# Patient Record
Sex: FEMALE | Race: OTHER | NOT HISPANIC OR LATINO
[De-identification: names, ages, dates, MRNs, and addresses within clinical notes are randomized per-mention and may not be internally consistent; named-entity substitution may affect disease eponyms.]

---

## 2021-01-04 PROBLEM — Z00.00 ENCOUNTER FOR PREVENTIVE HEALTH EXAMINATION: Status: ACTIVE | Noted: 2021-01-04

## 2021-01-11 ENCOUNTER — APPOINTMENT (OUTPATIENT)
Dept: OBGYN | Facility: CLINIC | Age: 36
End: 2021-01-11
Payer: MEDICAID

## 2021-01-11 VITALS — SYSTOLIC BLOOD PRESSURE: 100 MMHG | DIASTOLIC BLOOD PRESSURE: 60 MMHG | WEIGHT: 121 LBS

## 2021-01-11 DIAGNOSIS — Z78.9 OTHER SPECIFIED HEALTH STATUS: ICD-10-CM

## 2021-01-11 DIAGNOSIS — N39.0 URINARY TRACT INFECTION, SITE NOT SPECIFIED: ICD-10-CM

## 2021-01-11 DIAGNOSIS — Z82.49 FAMILY HISTORY OF ISCHEMIC HEART DISEASE AND OTHER DISEASES OF THE CIRCULATORY SYSTEM: ICD-10-CM

## 2021-01-11 PROCEDURE — 36415 COLL VENOUS BLD VENIPUNCTURE: CPT

## 2021-01-11 PROCEDURE — 0500F INITIAL PRENATAL CARE VISIT: CPT

## 2021-01-11 RX ORDER — PNV NO.95/FERROUS FUM/FOLIC AC 28MG-0.8MG
TABLET ORAL
Refills: 0 | Status: ACTIVE | COMMUNITY

## 2021-01-11 NOTE — HISTORY OF PRESENT ILLNESS
[N] : Patient does not use contraception [Monogamous (Male Partner)] : is monogamous with a male partner [Y] : Positive pregnancy history [FreeTextEntry1] : Confirmation of pregnancy.\par LMP:10/6/2020\par RICCO:7/14/2021\par 13w6D [MensesFreq] : 28 [MensesLength] : 3-4 [PGxTotal] : 1 [facility] : 0

## 2021-01-12 LAB
ABO + RH PNL BLD: NORMAL
BASOPHILS # BLD AUTO: 0.06 K/UL
BASOPHILS NFR BLD AUTO: 0.5 %
BLD GP AB SCN SERPL QL: NORMAL
EOSINOPHIL # BLD AUTO: 0.14 K/UL
EOSINOPHIL NFR BLD AUTO: 1.3 %
HBV SURFACE AG SER QL: NONREACTIVE
HCT VFR BLD CALC: 42.1 %
HCV AB SER QL: NONREACTIVE
HCV S/CO RATIO: 0.11 S/CO
HGB BLD-MCNC: 13.7 G/DL
HIV1+2 AB SPEC QL IA.RAPID: NONREACTIVE
IMM GRANULOCYTES NFR BLD AUTO: 0.5 %
LYMPHOCYTES # BLD AUTO: 1.64 K/UL
LYMPHOCYTES NFR BLD AUTO: 14.8 %
MAN DIFF?: NORMAL
MCHC RBC-ENTMCNC: 32.5 GM/DL
MCHC RBC-ENTMCNC: 32.6 PG
MCV RBC AUTO: 100.2 FL
MONOCYTES # BLD AUTO: 0.72 K/UL
MONOCYTES NFR BLD AUTO: 6.5 %
NEUTROPHILS # BLD AUTO: 8.45 K/UL
NEUTROPHILS NFR BLD AUTO: 76.4 %
PLATELET # BLD AUTO: 258 K/UL
RBC # BLD: 4.2 M/UL
RBC # FLD: 13.7 %
TSH SERPL-ACNC: 0.89 UIU/ML
WBC # FLD AUTO: 11.06 K/UL

## 2021-01-13 LAB
C TRACH RRNA SPEC QL NAA+PROBE: NOT DETECTED
CANDIDA VAG CYTO: DETECTED
CMV IGG SERPL QL: 5.2 U/ML
CMV IGG SERPL-IMP: POSITIVE
CMV IGM SERPL QL: <8 AU/ML
CMV IGM SERPL QL: NEGATIVE
G VAGINALIS+PREV SP MTYP VAG QL MICRO: NOT DETECTED
HGB A MFR BLD: 97.4 %
HGB A2 MFR BLD: 2.6 %
HGB FRACT BLD-IMP: NORMAL
HSV 1+2 IGG SER IA-IMP: POSITIVE
HSV 1+2 IGG SER IA-IMP: POSITIVE
HSV1 IGG SER QL: 43.1 INDEX
HSV2 IGG SER QL: 4.06 INDEX
MEV IGG FLD QL IA: 141 AU/ML
MEV IGG+IGM SER-IMP: POSITIVE
N GONORRHOEA RRNA SPEC QL NAA+PROBE: NOT DETECTED
RUBV IGG FLD-ACNC: 29.3 INDEX
RUBV IGG SER-IMP: POSITIVE
RUBV IGM FLD-ACNC: <20 AU/ML
SOURCE AMPLIFICATION: NORMAL
T GONDII AB SER-IMP: NEGATIVE
T GONDII AB SER-IMP: POSITIVE
T GONDII IGG SER QL: 70.2 IU/ML
T GONDII IGM SER QL: <3 AU/ML
T PALLIDUM AB SER QL IA: NEGATIVE
T VAGINALIS VAG QL WET PREP: NOT DETECTED
VZV AB TITR SER: POSITIVE
VZV IGG SER IF-ACNC: 2681 INDEX

## 2021-01-14 LAB
B19V IGG SER QL IA: 2.9 INDEX
B19V IGG+IGM SER-IMP: NORMAL
B19V IGG+IGM SER-IMP: POSITIVE
B19V IGM FLD-ACNC: 0.22 INDEX
B19V IGM SER-ACNC: NEGATIVE
BACTERIA UR CULT: NORMAL
CYTOLOGY CVX/VAG DOC THIN PREP: ABNORMAL
HSV1 IGM SER QL: NORMAL TITER
HSV2 AB FLD-ACNC: NORMAL TITER
VZV IGM SER IF-ACNC: <0.91 INDEX

## 2021-01-16 LAB
AR GENE MUT ANL BLD/T: NORMAL
MEV IGM SER QL: <0.91 ISR

## 2021-01-19 LAB
CFTR MUT TESTED BLD/T: NEGATIVE
FMR1 GENE MUT ANL BLD/T: NORMAL

## 2021-01-25 LAB
15Q 11.2 DELETION: NEGATIVE
1P36 DELETION SYNDROME: NEGATIVE
229 11.2 DELETION SYNDROME: NEGATIVE
4P DELETION: NEGATIVE
5P DELETION: NEGATIVE
CHROMOSOME 13 ANEUPLOIDY: NEGATIVE
CHROMOSOME 18 ANEUPLOIDY: NEGATIVE
CHROMOSOME 21 ANEUPLOIDY: NEGATIVE

## 2021-01-28 ENCOUNTER — EMERGENCY (EMERGENCY)
Facility: HOSPITAL | Age: 36
LOS: 1 days | Discharge: HOME | End: 2021-01-28
Attending: EMERGENCY MEDICINE | Admitting: EMERGENCY MEDICINE
Payer: COMMERCIAL

## 2021-01-28 VITALS
WEIGHT: 113.1 LBS | HEIGHT: 60 IN | TEMPERATURE: 98 F | DIASTOLIC BLOOD PRESSURE: 77 MMHG | RESPIRATION RATE: 18 BRPM | SYSTOLIC BLOOD PRESSURE: 120 MMHG | HEART RATE: 81 BPM | OXYGEN SATURATION: 98 %

## 2021-01-28 DIAGNOSIS — B37.9 CANDIDIASIS, UNSPECIFIED: ICD-10-CM

## 2021-01-28 DIAGNOSIS — O23.592 INFECTION OF OTHER PART OF GENITAL TRACT IN PREGNANCY, SECOND TRIMESTER: ICD-10-CM

## 2021-01-28 DIAGNOSIS — Z3A.15 15 WEEKS GESTATION OF PREGNANCY: ICD-10-CM

## 2021-01-28 LAB
APPEARANCE UR: CLEAR — SIGNIFICANT CHANGE UP
BILIRUB UR-MCNC: NEGATIVE — SIGNIFICANT CHANGE UP
COLOR SPEC: YELLOW — SIGNIFICANT CHANGE UP
DIFF PNL FLD: NEGATIVE — SIGNIFICANT CHANGE UP
GLUCOSE UR QL: NEGATIVE — SIGNIFICANT CHANGE UP
KETONES UR-MCNC: NEGATIVE — SIGNIFICANT CHANGE UP
LEUKOCYTE ESTERASE UR-ACNC: NEGATIVE — SIGNIFICANT CHANGE UP
NITRITE UR-MCNC: NEGATIVE — SIGNIFICANT CHANGE UP
PH UR: 6 — SIGNIFICANT CHANGE UP (ref 5–8)
PROT UR-MCNC: NEGATIVE MG/DL — SIGNIFICANT CHANGE UP
SP GR SPEC: 1.01 — SIGNIFICANT CHANGE UP (ref 1–1.03)
UROBILINOGEN FLD QL: 0.2 E.U./DL — SIGNIFICANT CHANGE UP

## 2021-01-28 PROCEDURE — 87591 N.GONORRHOEAE DNA AMP PROB: CPT

## 2021-01-28 PROCEDURE — 87491 CHLMYD TRACH DNA AMP PROBE: CPT

## 2021-01-28 PROCEDURE — 87800 DETECT AGNT MULT DNA DIREC: CPT

## 2021-01-28 PROCEDURE — 99283 EMERGENCY DEPT VISIT LOW MDM: CPT

## 2021-01-28 PROCEDURE — 87086 URINE CULTURE/COLONY COUNT: CPT

## 2021-01-28 PROCEDURE — 36415 COLL VENOUS BLD VENIPUNCTURE: CPT

## 2021-01-28 PROCEDURE — 99284 EMERGENCY DEPT VISIT MOD MDM: CPT

## 2021-01-28 PROCEDURE — 81003 URINALYSIS AUTO W/O SCOPE: CPT

## 2021-01-28 NOTE — ED ADULT NURSE NOTE - CHIEF COMPLAINT QUOTE
pt c/o vaginal discharge x 3 weeks, green in color x1 week. pt is 15 weeks pregnant, called md twice today with no response, came to ed for eval after researching online. pt is Slovak speaking,  interpreting

## 2021-01-28 NOTE — ED PROVIDER NOTE - OBJECTIVE STATEMENT
Pt w/ PMHx  (ectopic), PSHx spinal herniated disk and inguinal hernia p/w vag discharge x 2 weeks. The discharge is white, but now becoming yellow, w/ vaginal itching. No pelvic pain, f/c, n/v. No hx STDs. No concern for STDs. No recent abx use. PT saw OB Dr Richter 2 weeks ago, had testing, but unsure of the results. Pt attempted to contact him today w/o response, prompting ED visit. No vag bleeding. PT reports prior US showing IUP.

## 2021-01-28 NOTE — ED PROVIDER NOTE - PATIENT PORTAL LINK FT
You can access the FollowMyHealth Patient Portal offered by [facility] by registering at the following website: [email or web address]. By joining [employer]’s FollowMyHealth portal, you will also be able to view your health information using other applications (apps) compatible with our system.

## 2021-01-28 NOTE — ED PROVIDER NOTE - PROGRESS NOTE DETAILS
Outpt labs from 1/11/21 shows BV panel + for candida, neg for trich/gardenella. HIV neg. GC/chlam neg.

## 2021-01-28 NOTE — ED PROVIDER NOTE - NSFOLLOWUPINSTRUCTIONS_ED_ALL_ED_FT
You were evaluated in the ED for vaginal discharge. Your recent vaginal swabs performed in your OB's office on 1/11/21 were reviewed and were significant for candidal yeast infection. The treatment is intravaginal cream, prescribed today. You vaginal swabs were again repeated (bacterial vaginosis panel, gonorrhea/ chlamydia tests) and the results will return in 2-3 days. You obstetrician will be able to see these results as an outpatient. Additionally, a urine culture was sent today. A bedside ultrasound was performed and showed good fetal movement and a normal fetal heart beat of 141 beats.     Refrain from having sexual intercourse until you follow up with your obstetrician and otherwise advised. Return to the ED for abdominal pain, bleeding, clear vaginal discharge, fever, or other concerning symptoms.     Le evaluaron en el servicio de urgencias para detectar flujo vaginal. Se revisaron jayla recientes hisopos vaginales realizados en el consultorio de sandoval obstetra el 1/11/21 y fueron significativos para la candidiasis candidiasis. El tratamiento es dieter crema intravaginal, prescrita maximiliano. Se repitieron nuevamente jayla hisopos vaginales (panel de vaginosis bacteriana, pruebas de gonorrea / clamdidia) y los resultados volverán en 2-3 días. Sandoval obstetra podrá geraldo estos resultados de forma ambulatoria. Además, hoy se envió un cultivo de orina. Se realizó dieter ecografía de cabecera que mostró un buen movimiento fetal y un latido fetal normal de 141 latidos.    Abstente de tener relaciones sexuales hasta que hagas un seguimiento con tu obstetra y te indique lo contrario. Regrese al servicio de urgencias si tiene dolor abdominal, sangrado, flujo vaginal kaylie, fiebre u otros síntomas preocupantes.      Candidiasis (infección por hongos)    LO QUE NECESITA SABER:    La infección por hongos, o candidiasis vaginal, dieter infección vaginal común. La infección por hongos es causada por un hongo o microorganismo levaduriforme. Los hongos se encuentran normalmente en la vagina. Muchos hongos pueden causar dieter infección.    INSTRUCCIONES SOBRE EL AARON HOSPITALARIA:    Llame a sandoval médico o ginecólogo si:  •Usted tiene fiebre y escalofríos.      •Usted desarrolla un dolor abdominal o pélvico.      •La secreción contiene clark y no es de la menstruación.      •Jayla signos y síntomas empeoran, incluso después del tratamiento.      •Usted tiene preguntas o inquietudes acerca de sandoval condición o cuidado.      Medicamentos:  •Los medicamentosayudan a tratar la infección por el hongo de la cándida y a disminuir la inflamación. El medicamento puede venir en presentación de píldora, crema, ungüento, comprimido o supositorio para la vagina.      •[place] jayla medicamentos elinor se le haya indicado.Consulte con sandoval médico si usted sha que sandoval medicamento no le está ayudando o si presenta efectos secundarios. Infórmele si es alérgico a algún medicamento. Mantenga dieter lista actualizada de los medicamentos, las vitaminas y los productos herbales que jesu. Incluya los siguientes datos de los medicamentos: cantidad, frecuencia y motivo de administración. Traiga con usted la lista o los envases de las píldoras a jayla citas de seguimiento. Lleve la lista de los medicamentos con usted en betsy de dieter emergencia.      Mantenga sandoval vagina saludable:  •Limpie alrededor del área genital con agua tibia y un jabón suave todos los días.No coloque jabón dentro de la vagina. Después de lavada, séquela suavemente. No use jacuzzis. El calor y la humedad de los jacuzzis pueden aumentar el riesgo de otra candidiasis.      •Límpiese siempre de adelante hacia atrásdespués de usar el inodoro. [place] rogelio la diseminación de bacterias desde el área rectal hacia la vagina.      •No use ropa ni lencería apretadadurante largos períodos. Que use ropa interior de algodón leydi el día. El algodón ayuda a mantener el área genital seca y no mantiene el calor o la humedad. No use ninguna ropa interior por las noches.      •No tome duchas vaginalesni use aerosoles de higiene femenina o gasper de burbujas. No utilice almohadillas o tampones que son perfumados o de colores ni papel higiénico perfumado.      •No tenga relaciones sexuales hasta que jayla síntomas hayan desaparecido.Michelle que sandoval lois use un preservativo hasta que usted complete el tratamiento.      •Consulte con sandoval médico acerca de las opciones de anticonceptivos, si es necesario.Los condones de látex y los diafragmas tienen un gel que rivera los espermatozoides. Ambos pueden irritar el área genital.      Acuda a jayla consultas de control con sandoval médico o ginecólogo según le indicaron:Anote jayla preguntas para que se acuerde de hacerlas leydi jayla visitas.

## 2021-01-28 NOTE — ED PROVIDER NOTE - NS ED ROS FT
Constitutional: No fever or chills.   Eyes: No pain, blurry vision, or discharge.  ENMT: No hearing changes, pain, discharge or infections. No neck pain or stiffness.  Cardiac: No chest pain, SOB or edema. No chest pain with exertion.  Respiratory: No cough or respiratory distress. No hemoptysis. No history of asthma or RAD.  GI: No nausea, vomiting, diarrhea or abdominal pain.  : No dysuria, frequency or burning. See HPI  MS: No myalgia, muscle weakness, joint pain or back pain.  Neuro: No headache or weakness. No LOC.  Skin: No skin rash.   Endocrine: No history of thyroid disease or diabetes.  Except as documented in the HPI, all other systems are negative.

## 2021-01-28 NOTE — ED ADULT TRIAGE NOTE - CHIEF COMPLAINT QUOTE
pt c/o vaginal discharge x 3 weeks, green in color x1 week. pt is 15 weeks pregnant, called md twice today with no response, came to ed for eval after researching online. pt is Welsh speaking,  interpreting

## 2021-01-28 NOTE — ED PROVIDER NOTE - CLINICAL SUMMARY MEDICAL DECISION MAKING FREE TEXT BOX
Pt p/w vag discharge. NO abd pain, f/c, n/v. + prior IUP. Bedside TA US shows IUP w/ good fetal movement and . + recent vaginal swabs only negative for candida. Pt not concerned about STDs. Given discharge more yellow at this time,  will repeat swabs, and initiate tx for candidal yeast infection. PT has next OB appt in 10 days, instructed continue f/u. Pt has no urinary sx, and UA likely to be contaminated, f/u UCx results. Return precautions given.

## 2021-01-28 NOTE — ED ADULT NURSE NOTE - OBJECTIVE STATEMENT
35 y/o female received into the ED, axox3, with complaints of vaginal discharge x3 weeks.  Pt. states that she is pregnant with approx.. 15 weeks pregnant.  Pt. denies having any abdominal pain.

## 2021-01-28 NOTE — ED PROVIDER NOTE - PHYSICAL EXAMINATION
Limited PE performed in the setting of the COVID10 pandemic, in efforts to limit exposure and cross-contamination  Constitutional: Well appearing, well nourished, awake, alert, oriented to person, place, time/situation and in no apparent distress.  ENMT: Airway patent.   Eyes: Clear bilaterally  Cardiac: Normal rate, regular rhythm.   Respiratory: No increased WOB, tachypnea, hypoxia, or accessory mm use. Pt speaks in full sentences.   Gastrointestinal: Abd soft, NT, ND. No guarding, rebound, or rigidity. gravid uterus. bedside US shows + fetal movement and  by M mode  : normal external genitalia. No CMT or adnexal ttp. + yellowish discharge, some thick, chunky discharge  Musculoskeletal: Range of motion is not limited  Neuro: Alert and oriented x 3, face symmetric and speech fluent. Nml gross motor movement, grossly non focal   Skin: Skin normal color for race, warm, dry and intact. No evidence of rash.  Psych: Alert and oriented to person, place, time/situation. normal mood and affect. no apparent risk to self or others.

## 2021-01-30 LAB
C TRACH RRNA SPEC QL NAA+PROBE: SIGNIFICANT CHANGE UP
CANDIDA AB TITR SER: DETECTED
CULTURE RESULTS: NO GROWTH — SIGNIFICANT CHANGE UP
G VAGINALIS DNA SPEC QL NAA+PROBE: DETECTED
N GONORRHOEA RRNA SPEC QL NAA+PROBE: SIGNIFICANT CHANGE UP
SPECIMEN SOURCE: SIGNIFICANT CHANGE UP
SPECIMEN SOURCE: SIGNIFICANT CHANGE UP
T VAGINALIS SPEC QL WET PREP: SIGNIFICANT CHANGE UP

## 2021-02-01 RX ORDER — METRONIDAZOLE 7.5 MG/G
1 GEL VAGINAL
Qty: 5 | Refills: 0
Start: 2021-02-01 | End: 2021-02-05

## 2021-02-02 RX ORDER — METRONIDAZOLE 7.5 MG/G
1 GEL VAGINAL
Qty: 7 | Refills: 0
Start: 2021-02-02 | End: 2021-02-08

## 2021-02-08 ENCOUNTER — APPOINTMENT (OUTPATIENT)
Dept: OBGYN | Facility: CLINIC | Age: 36
End: 2021-02-08
Payer: MEDICAID

## 2021-02-08 ENCOUNTER — NON-APPOINTMENT (OUTPATIENT)
Age: 36
End: 2021-02-08

## 2021-02-08 VITALS — SYSTOLIC BLOOD PRESSURE: 100 MMHG | WEIGHT: 115 LBS | DIASTOLIC BLOOD PRESSURE: 60 MMHG

## 2021-02-08 PROCEDURE — 0502F SUBSEQUENT PRENATAL CARE: CPT

## 2021-02-08 PROCEDURE — 36415 COLL VENOUS BLD VENIPUNCTURE: CPT

## 2021-02-09 ENCOUNTER — APPOINTMENT (OUTPATIENT)
Dept: ANTEPARTUM | Facility: CLINIC | Age: 36
End: 2021-02-09
Payer: MEDICAID

## 2021-02-09 PROCEDURE — 76817 TRANSVAGINAL US OBSTETRIC: CPT

## 2021-02-09 PROCEDURE — 76811 OB US DETAILED SNGL FETUS: CPT

## 2021-02-09 PROCEDURE — 99072 ADDL SUPL MATRL&STAF TM PHE: CPT

## 2021-02-12 LAB
2ND TRIMESTER DATA: NORMAL
AFP PNL SERPL: NORMAL
AFP SERPL-ACNC: NORMAL
B-HCG FREE SERPL-MCNC: NORMAL
CLINICAL BIOCHEMIST REVIEW: NORMAL
INHIBIN A SERPL-MCNC: NORMAL
NOTES NTD: NORMAL
U ESTRIOL SERPL-SCNC: NORMAL

## 2021-02-16 ENCOUNTER — ASOB RESULT (OUTPATIENT)
Age: 36
End: 2021-02-16

## 2021-02-16 ENCOUNTER — APPOINTMENT (OUTPATIENT)
Dept: ANTEPARTUM | Facility: CLINIC | Age: 36
End: 2021-02-16
Payer: MEDICAID

## 2021-02-16 PROCEDURE — 99072 ADDL SUPL MATRL&STAF TM PHE: CPT

## 2021-02-16 PROCEDURE — 59000 AMNIOCENTESIS DIAGNOSTIC: CPT

## 2021-02-16 PROCEDURE — 76946 ECHO GUIDE FOR AMNIOCENTESIS: CPT

## 2021-02-22 ENCOUNTER — EMERGENCY (EMERGENCY)
Facility: HOSPITAL | Age: 36
LOS: 1 days | Discharge: HOME | End: 2021-02-22
Attending: EMERGENCY MEDICINE | Admitting: EMERGENCY MEDICINE
Payer: COMMERCIAL

## 2021-02-22 VITALS
DIASTOLIC BLOOD PRESSURE: 77 MMHG | WEIGHT: 117.07 LBS | OXYGEN SATURATION: 98 % | HEIGHT: 60 IN | RESPIRATION RATE: 18 BRPM | HEART RATE: 88 BPM | SYSTOLIC BLOOD PRESSURE: 116 MMHG | TEMPERATURE: 99 F

## 2021-02-22 DIAGNOSIS — Z3A.19 19 WEEKS GESTATION OF PREGNANCY: ICD-10-CM

## 2021-02-22 DIAGNOSIS — M54.6 PAIN IN THORACIC SPINE: ICD-10-CM

## 2021-02-22 DIAGNOSIS — O26.892 OTHER SPECIFIED PREGNANCY RELATED CONDITIONS, SECOND TRIMESTER: ICD-10-CM

## 2021-02-22 DIAGNOSIS — Z79.899 OTHER LONG TERM (CURRENT) DRUG THERAPY: ICD-10-CM

## 2021-02-22 PROCEDURE — 99284 EMERGENCY DEPT VISIT MOD MDM: CPT

## 2021-02-22 PROCEDURE — 99283 EMERGENCY DEPT VISIT LOW MDM: CPT

## 2021-02-22 RX ORDER — LIDOCAINE 4 G/100G
1 CREAM TOPICAL
Qty: 30 | Refills: 0
Start: 2021-02-22 | End: 2021-03-23

## 2021-02-22 RX ORDER — ACETAMINOPHEN 500 MG
1000 TABLET ORAL ONCE
Refills: 0 | Status: COMPLETED | OUTPATIENT
Start: 2021-02-22 | End: 2021-02-22

## 2021-02-22 RX ORDER — ACETAMINOPHEN 500 MG
2 TABLET ORAL
Qty: 30 | Refills: 0
Start: 2021-02-22

## 2021-02-22 RX ORDER — LIDOCAINE 4 G/100G
1 CREAM TOPICAL ONCE
Refills: 0 | Status: COMPLETED | OUTPATIENT
Start: 2021-02-22 | End: 2021-02-22

## 2021-02-22 RX ADMIN — LIDOCAINE 1 PATCH: 4 CREAM TOPICAL at 22:27

## 2021-02-22 RX ADMIN — Medication 1000 MILLIGRAM(S): at 22:25

## 2021-02-22 NOTE — ED ADULT NURSE NOTE - OBJECTIVE STATEMENT
Pt reports R upper back pain ongoing x2 weeks, worse since Saturday. Pt reports pain to R upper back, no radiation reported, pain described as sharp/stabbing and intermittent, worse with movement. Pt denies N/V/D, no urinary symptoms, no abd pain. Pt is 19 weeks pregnant, no vaginal bleeding/discharge. Pt denies SOB/CP, no cough, no fever/chills, no recent travel, no known exposure to covid. Pt reports she took Tylenol yesterday with no pain relief. Significant other at bedside. Pt updated on POC.

## 2021-02-22 NOTE — ED ADULT NURSE NOTE - NS ED NOTE  TALK SOMEONE YN
slim is requesting a call back states that her  would like to speak to Dr. Pink for her settlement.    No

## 2021-02-22 NOTE — ED PROVIDER NOTE - OBJECTIVE STATEMENT
37 y/o F pt who is  and 19 weeks pregnant with no pertinent PMHx or PSHx presents to ED c/o of mid-thoracic back pain radiating to both sides x 2 weeks. Pt states her pain feels like an electric shock, and she's had similar symptoms before in her lower back due to a herniated disc, but it had improved in her lower back; pt's current pain is only in the thoracic region. Pt relates taking Tylenol 325mg with no improvement. Pt denies any pregnancy complaints, cramping, vaginal bleeding, chest pain, shortness of breath, pleuritic nature to pain, weakness to arms, or any other acute complaints.

## 2021-02-22 NOTE — ED PROVIDER NOTE - NSFOLLOWUPINSTRUCTIONS_ED_ALL_ED_FT
Use lidocaine patch and tylenol as prescribed.    Follow up with your OB tomorrow.    Return to ED with worsening pain or other concerns.          Back Pain    WHAT YOU NEED TO KNOW:    What do I need to know about back pain? Back pain is common. You may have back pain and muscle spasms. You may feel sore or stiff on one or both sides of your back. The pain may spread to your lower body. Conditions that affect the spine, joints, or muscles can cause back pain. These may include arthritis, spinal stenosis (narrowing of the spinal column), muscle tension, or breakdown of the spinal discs.    What increases my risk for back pain?   •Repeated bending, lifting, or twisting, or lifting heavy items      •Injury from a fall or accident      •Lack of regular physical activity       •Obesity or pregnancy       •Smoking      •Aging      •Driving, sitting, or standing for long periods      •Bad posture while sitting or standing      How is back pain diagnosed? Your healthcare provider will ask if you have any medical conditions. He or she may ask if you have a history of back pain and how it started. He or she may watch you stand and walk, and check your range of motion. Show him or her where you feel pain and what makes it better or worse. Describe the pain, how bad it is, and how long it lasts. Tell your provider if your pain worsens at night or when you lie on your back.    How is back pain treated?   •Medicines: ?NSAIDs, such as ibuprofen, help decrease swelling, pain, and fever. This medicine is available with or without a doctor's order. NSAIDs may be given as a pill or as a cream that is applied to your back. NSAIDs can cause stomach bleeding or kidney problems in certain people. If you take blood thinner medicine, always ask your healthcare provider if NSAIDs are safe for you. Always read the medicine label and follow directions.      ?Acetaminophen decreases pain and fever. It is available without a doctor's order. Ask how much to take and how often to take it. Follow directions. Read the labels of all other medicines you are using to see if they also contain acetaminophen, or ask your doctor or pharmacist. Acetaminophen can cause liver damage if not taken correctly. Do not use more than 4 grams (4,000 milligrams) total of acetaminophen in one day.       ?Muscle relaxers help decrease muscle spasms and back pain.      •Acupressure may be recommended to decrease pain and improve movement. Acupressure is pressure or localized massage to the area of your back pain.       •A transcutaneous electrical nerve stimulation (TENS) unit is a portable, pocket-sized, battery-powered device that attaches to your skin. It is usually placed over the area of pain. It uses mild, safe electrical signals to help control pain.      How do I manage my back pain?   •Apply ice on your back for 15 to 20 minutes every hour or as directed. Use an ice pack, or put crushed ice in a plastic bag. Cover it with a towel before you apply it to your skin. Ice helps prevent tissue damage and decreases pain.      •Apply heat on your back for 20 to 30 minutes every 2 hours for as many days as directed. Heat helps decrease pain and muscle spasms.      •Stay active as much as you can without causing more pain. Bed rest could make your back pain worse. Avoid heavy lifting until your pain is gone.      •Go to physical therapy as directed. A physical therapist can teach you exercises to help improve movement and strength, and to decrease pain.      Call your local emergency number (911 in the ) if:   •You have severe back pain with chest pain.      •You cannot control your urine or bowel movements.      •Your pain becomes so severe that you cannot walk.      When should I seek immediate care?   •You have pain, numbness, or weakness in one or both legs.      •You have severe back pain, nausea, and vomiting.      •You have severe back pain that spreads to your side or genital area.      When should I call my doctor?   •You have back pain that does not get better with rest and pain medicine.      •You have a fever.      •You have pain that worsens when you are on your back or when you rest.      •You have pain that worsens when you cough or sneeze.      •You lose weight without trying.      •You have questions or concerns about your condition or care.      CARE AGREEMENT:    You have the right to help plan your care. Learn about your health condition and how it may be treated. Discuss treatment options with your healthcare providers to decide what care you want to receive. You always have the right to refuse treatment.        © Copyright [employer] 2021           back to top                          © Copyright [employer] 2021

## 2021-02-22 NOTE — ED ADULT TRIAGE NOTE - OTHER COMPLAINTS
19 weeks  presenting to ED c/o upper back pain x 2 weeks, located between shoulders radiating to R arm, sharp shooting pain to arm with sudden movements, no numbness/tingling, relieved by not moving.  Took tylenol last night with no relief.

## 2021-02-22 NOTE — ED PROVIDER NOTE - PATIENT PORTAL LINK FT
You can access the FollowMyHealth Patient Portal offered by [facility] by registering at the following website: [email or web address]. By joining [employer]’s FollowMyHealth portal, you will also be able to view your health information using other applications (apps) compatible with our system.

## 2021-02-23 ENCOUNTER — EMERGENCY (EMERGENCY)
Facility: HOSPITAL | Age: 36
LOS: 1 days | Discharge: HOME | End: 2021-02-23
Attending: EMERGENCY MEDICINE | Admitting: EMERGENCY MEDICINE
Payer: COMMERCIAL

## 2021-02-23 VITALS
RESPIRATION RATE: 18 BRPM | HEART RATE: 91 BPM | WEIGHT: 117.07 LBS | DIASTOLIC BLOOD PRESSURE: 75 MMHG | TEMPERATURE: 100 F | OXYGEN SATURATION: 100 % | SYSTOLIC BLOOD PRESSURE: 114 MMHG | HEIGHT: 60 IN

## 2021-02-23 DIAGNOSIS — Z3A.20 20 WEEKS GESTATION OF PREGNANCY: ICD-10-CM

## 2021-02-23 DIAGNOSIS — O99.891 OTHER SPECIFIED DISEASES AND CONDITIONS COMPLICATING PREGNANCY: ICD-10-CM

## 2021-02-23 DIAGNOSIS — Z79.899 OTHER LONG TERM (CURRENT) DRUG THERAPY: ICD-10-CM

## 2021-02-23 DIAGNOSIS — R51.9 HEADACHE, UNSPECIFIED: ICD-10-CM

## 2021-02-23 PROCEDURE — 99283 EMERGENCY DEPT VISIT LOW MDM: CPT

## 2021-02-23 PROCEDURE — 99284 EMERGENCY DEPT VISIT MOD MDM: CPT

## 2021-02-23 NOTE — ED PROVIDER NOTE - CLINICAL SUMMARY MEDICAL DECISION MAKING FREE TEXT BOX
most likely headache is 2/2 the muscular pain she is having in her upper back and radiating up. doubt dangerous intracranial process at this time, and not hypertensive in the ED. plan for pain control and GYN f/u

## 2021-02-23 NOTE — ED ADULT NURSE NOTE - CAS DISCH TRANSFER METHOD
1850  Patient's in bed. No changes in status. Bedside report given to Tere LOBO RN (Ana María).   Private car

## 2021-02-23 NOTE — ED PROVIDER NOTE - PHYSICAL EXAMINATION
CONSTITUTIONAL: Well-appearing; well-nourished; in no apparent distress.   HEAD: Normocephalic; atraumatic.   EYES:  conjunctiva and sclera clear  ENT: normal nose; no rhinorrhea;  NECK: Supple; full ROM  RESPIRATORY: Breathing easily; no resp difficulty  EXT: No cyanosis or edema;  SKIN: Normal for age and race; warm; dry; good turgor; no apparent lesions or rash.   NEURO: A & O x 3; face symmetric; ambulatory w/ steady gait, equal strength in all extremities, no facial droop, grossly unremarkable.   PSYCHOLOGICAL: The patient’s mood and manner are appropriate.

## 2021-02-23 NOTE — ED PROVIDER NOTE - PATIENT PORTAL LINK FT
You can access the FollowMyHealth Patient Portal offered by [facility] by registering at the following website: [email or web address]. By joining [employer]’s FollowMyHealth portal, you will also be able to view your health information using other applications (apps) compatible with our system.

## 2021-02-23 NOTE — ED ADULT NURSE NOTE - OBJECTIVE STATEMENT
35 yo F  presents to ED co headache, pt states it began at around 1 AM and is getting worse, last took Tylenol at 7pm without relief. Aox3, speaking in clear and coherent sentences and ambulates with steady gait. NAD. VSS.

## 2021-02-23 NOTE — ED PROVIDER NOTE - NSFOLLOWUPINSTRUCTIONS_ED_ALL_ED_FT
Please take the new medication instead of the acetaminophen that we prescribed yesterday.  Please call your OBGYN tomorrow to follow up     Headache    A headache is pain or discomfort felt around the head or neck area. The specific cause of a headache may not be found as there are many types including tension headaches, migraine headaches, and cluster headaches. Watch your condition for any changes. Things you can do to manage your pain include taking over the counter and prescription medications as instructed by your health care provider, lying down in a dark quiet room, limiting stress, getting regular sleep, and refraining from alcohol and tobacco products.    SEEK IMMEDIATE MEDICAL CARE IF YOU HAVE ANY OF THE FOLLOWING SYMPTOMS: fever, vomiting, stiff neck, loss of vision, problems with speech, muscle weakness, loss of balance, trouble walking, passing out, or confusion.

## 2021-02-23 NOTE — ED PROVIDER NOTE - OBJECTIVE STATEMENT
Pt w/ PMHx  (ectopic), approx 19-20 weeks pregnant, PSHx spinal herniated disk and inguinal hernia, seen yesterday for back pain, now states pain ongoing and gradually going into her head. Points frontal and top of head. no visual changes, no numbness/weakness. No fevers/chills, infectious symptoms. no issues so far with her pregnancy, follows w/ Dr. Richter. using acetaminophen 1g q6hrs and lidocaine patch but not helping. last dose 7pm. headache started last night around 1am.

## 2021-02-24 PROBLEM — Z78.9 OTHER SPECIFIED HEALTH STATUS: Chronic | Status: ACTIVE | Noted: 2021-02-22

## 2021-03-01 ENCOUNTER — NON-APPOINTMENT (OUTPATIENT)
Age: 36
End: 2021-03-01

## 2021-03-01 ENCOUNTER — APPOINTMENT (OUTPATIENT)
Dept: OBGYN | Facility: CLINIC | Age: 36
End: 2021-03-01
Payer: MEDICAID

## 2021-03-01 VITALS
BODY MASS INDEX: 22.47 KG/M2 | SYSTOLIC BLOOD PRESSURE: 100 MMHG | HEIGHT: 61 IN | WEIGHT: 119 LBS | DIASTOLIC BLOOD PRESSURE: 60 MMHG

## 2021-03-01 PROCEDURE — 0502F SUBSEQUENT PRENATAL CARE: CPT

## 2021-03-09 ENCOUNTER — APPOINTMENT (OUTPATIENT)
Dept: ANTEPARTUM | Facility: CLINIC | Age: 36
End: 2021-03-09
Payer: MEDICAID

## 2021-03-09 ENCOUNTER — ASOB RESULT (OUTPATIENT)
Age: 36
End: 2021-03-09

## 2021-03-09 PROCEDURE — 99072 ADDL SUPL MATRL&STAF TM PHE: CPT

## 2021-03-09 PROCEDURE — 76816 OB US FOLLOW-UP PER FETUS: CPT

## 2021-03-09 PROCEDURE — 76817 TRANSVAGINAL US OBSTETRIC: CPT

## 2021-04-06 ENCOUNTER — NON-APPOINTMENT (OUTPATIENT)
Age: 36
End: 2021-04-06

## 2021-04-06 ENCOUNTER — APPOINTMENT (OUTPATIENT)
Dept: OBGYN | Facility: CLINIC | Age: 36
End: 2021-04-06
Payer: MEDICAID

## 2021-04-06 VITALS
DIASTOLIC BLOOD PRESSURE: 60 MMHG | WEIGHT: 123 LBS | HEIGHT: 61 IN | SYSTOLIC BLOOD PRESSURE: 100 MMHG | BODY MASS INDEX: 23.22 KG/M2

## 2021-04-06 PROCEDURE — 0502F SUBSEQUENT PRENATAL CARE: CPT

## 2021-04-06 PROCEDURE — 36415 COLL VENOUS BLD VENIPUNCTURE: CPT

## 2021-04-07 LAB
BASOPHILS # BLD AUTO: 0.05 K/UL
BASOPHILS NFR BLD AUTO: 0.5 %
EOSINOPHIL # BLD AUTO: 0.12 K/UL
EOSINOPHIL NFR BLD AUTO: 1.1 %
ESTIMATED AVERAGE GLUCOSE: 128 MG/DL
GLUCOSE 1H P 50 G GLC PO SERPL-MCNC: 263 MG/DL
HBA1C MFR BLD HPLC: 6.1 %
HCT VFR BLD CALC: 36.5 %
HGB BLD-MCNC: 11.8 G/DL
IMM GRANULOCYTES NFR BLD AUTO: 1.2 %
LYMPHOCYTES # BLD AUTO: 1.62 K/UL
LYMPHOCYTES NFR BLD AUTO: 14.7 %
MAN DIFF?: NORMAL
MCHC RBC-ENTMCNC: 32.3 GM/DL
MCHC RBC-ENTMCNC: 33.9 PG
MCV RBC AUTO: 104.9 FL
MONOCYTES # BLD AUTO: 0.82 K/UL
MONOCYTES NFR BLD AUTO: 7.4 %
NEUTROPHILS # BLD AUTO: 8.27 K/UL
NEUTROPHILS NFR BLD AUTO: 75.1 %
PLATELET # BLD AUTO: 206 K/UL
RBC # BLD: 3.48 M/UL
RBC # FLD: 14.1 %
T PALLIDUM AB SER QL IA: NEGATIVE
WBC # FLD AUTO: 11.01 K/UL

## 2021-04-07 RX ORDER — BLOOD SUGAR DIAGNOSTIC
STRIP MISCELLANEOUS 4 TIMES DAILY
Qty: 2 | Refills: 3 | Status: ACTIVE | COMMUNITY
Start: 2021-04-07 | End: 1900-01-01

## 2021-04-07 RX ORDER — LANCETS 33 GAUGE
EACH MISCELLANEOUS
Qty: 1 | Refills: 3 | Status: ACTIVE | COMMUNITY
Start: 2021-04-07 | End: 1900-01-01

## 2021-04-07 RX ORDER — CONTAINER,EMPTY
EACH MISCELLANEOUS
Qty: 1 | Refills: 0 | Status: ACTIVE | COMMUNITY
Start: 2021-04-07 | End: 1900-01-01

## 2021-04-07 RX ORDER — BLOOD-GLUCOSE METER
W/DEVICE KIT MISCELLANEOUS
Qty: 1 | Refills: 0 | Status: ACTIVE | COMMUNITY
Start: 2021-04-07 | End: 1900-01-01

## 2021-04-08 LAB — BACTERIA UR CULT: NORMAL

## 2021-04-22 ENCOUNTER — NON-APPOINTMENT (OUTPATIENT)
Age: 36
End: 2021-04-22

## 2021-04-22 ENCOUNTER — APPOINTMENT (OUTPATIENT)
Dept: OBGYN | Facility: CLINIC | Age: 36
End: 2021-04-22
Payer: MEDICAID

## 2021-04-22 VITALS — SYSTOLIC BLOOD PRESSURE: 100 MMHG | DIASTOLIC BLOOD PRESSURE: 60 MMHG | BODY MASS INDEX: 23.43 KG/M2 | WEIGHT: 124 LBS

## 2021-04-22 PROCEDURE — 0502F SUBSEQUENT PRENATAL CARE: CPT

## 2021-04-30 ENCOUNTER — APPOINTMENT (OUTPATIENT)
Dept: ENDOCRINOLOGY | Facility: CLINIC | Age: 36
End: 2021-04-30
Payer: MEDICAID

## 2021-04-30 VITALS
WEIGHT: 124 LBS | HEIGHT: 61 IN | DIASTOLIC BLOOD PRESSURE: 72 MMHG | HEART RATE: 91 BPM | SYSTOLIC BLOOD PRESSURE: 105 MMHG | BODY MASS INDEX: 23.41 KG/M2

## 2021-04-30 LAB — GLUCOSE BLDC GLUCOMTR-MCNC: 83

## 2021-04-30 PROCEDURE — 99072 ADDL SUPL MATRL&STAF TM PHE: CPT

## 2021-04-30 PROCEDURE — 99205 OFFICE O/P NEW HI 60 MIN: CPT | Mod: 25

## 2021-04-30 PROCEDURE — 82962 GLUCOSE BLOOD TEST: CPT

## 2021-04-30 NOTE — CONSULT LETTER
[Dear  ___] : Dear  [unfilled], [Consult Letter:] : I had the pleasure of evaluating your patient, [unfilled]. [Sincerely,] : Sincerely, [FreeTextEntry3] : Tino Maurice

## 2021-04-30 NOTE — ASSESSMENT
[Importance of Diet and Exercise] : importance of diet and exercise to improve glycemic control, achieve weight loss and improve cardiovascular health [Self Monitoring of Blood Glucose] : self monitoring of blood glucose [Carbohydrate Consistent Diet] : carbohydrate consistent diet [FreeTextEntry1] : 37 y/o F pt with: \par \par 1. Gestational diabetes, 29 weeks pregnant\par Pt has made changes to her lifestyle and diet and seen the nutritionist\par She consumes approximately 100 carbs qd. Her pre and post prandial BS readings are in target. \par Fating ~ 90's and 2 hs post meals < 120's\par No hx of proteinuria or HTN. \par Overview of gestational diabetes explained, including labor and delivery. \par Send labs\par Appt with RD, and nurse practitioner. \par \par Return: 2 months

## 2021-04-30 NOTE — REASON FOR VISIT
[Initial Evaluation] : an initial evaluation [Gestational Diabetes] : gestational diabetes [FreeTextEntry2] : Dr. Shelton Richter

## 2021-04-30 NOTE — ADDENDUM
[FreeTextEntry1] : I, Jamie Macias, acted solely as a scribe for Dr. Tino Maurice on this date. 04/30/2021.

## 2021-04-30 NOTE — HISTORY OF PRESENT ILLNESS
[FreeTextEntry1] : 37 y/o female pt, 29 weeks pregnant, with recent diagnosed of gestational diabetes (dx in 04/2021), referred by Dr. Shelton Richter, presents today to establish endocrine care with me \par No other PMHx \par PSHx: Inguinal hernia surgery (2017), meniscus surgery (2017)\par FHx: DM(grandmother, aunt) \par Denies parents and siblings having hx of DM or thyroid disorders. \par SHx: non-smoker/no ETOH use \par Lifestyle: Physically active / Sedentary lifestyle.pt eats 3 meals and 1 snack every day. First meal is at 8AM. Last meal is at 8:30-9PM. Check BS 3x a day. \par NKDA \par Pt previously has ectopic pregnancy in 2013\par \par 04/30/2021\par Pt has went to hospital a few times during he pregnancy due to back pain. \par Pt had Glucose Screening/Tolerance Test during on approximately 04/06/21 and pt was informed by OBGYN that she has gestational diabetes. Pt was instructed to check BS a few times a day. \par Pt saw OBGYN 2 weeks after diagnosis and who informed her of high BS levels  After that pt changed her diet and her BS readings have improved. \par Pt reports she has seen a nutritionist recently. \par \par Today pt presents today, 29 weeks pregnant, for DM evaluation with POCT 83, /72 , BMI 23.43,  feeling well. \par Denies osmotic diuresis symptoms and pain/discomfort in LE. \par Pt states she consistently checks BS every day. Pt brought BS readings: \par BS review over past 5 days\par -FBS: 92, 96, 85, 98, 87 \par - Post prandial BS(taken 1 hour after meal): 104, 147, 102, 111, 139, 126 \par \par Current Medications: Vit D, folic acid \par \par Labs: \par - 4/6/21: A1c 6.1%, \par - 1/11/21: TSH 0.89,

## 2021-04-30 NOTE — END OF VISIT
[Time Spent: ___ minutes] : I have spent [unfilled] minutes of time on the encounter. [FreeTextEntry3] : All medical record entries made by the Scribe were at my, Dr. Tino Maurice, direction and personally dictated by me on 04/30/2021. I have reviewed the chart and agree that the record accurately reflects my personal performance of the history, physical exam, assessment and plan. I have also personally directed, reviewed and agreed with the chart.

## 2021-04-30 NOTE — PHYSICAL EXAM
[Right Foot Was Examined] : right foot ~C was examined [No Edema] : no peripheral edema [Left Foot Was Examined] : left foot ~C was examined [2+] : 2+ in the dorsalis pedis [Alert] : alert [Normal Sclera/Conjunctiva] : normal sclera/conjunctiva [Normal Outer Ear/Nose] : the ears and nose were normal in appearance [No Neck Mass] : no neck mass was observed [Thyroid Not Enlarged] : the thyroid was not enlarged [No Respiratory Distress] : no respiratory distress [Normal S1, S2] : normal S1 and S2 [Normal Rate] : heart rate was normal [Normal Bowel Sounds] : normal bowel sounds [Spine Straight] : spine straight [Normal Gait] : normal gait [No Rash] : no rash [Normal Reflexes] : deep tendon reflexes were 2+ and symmetric [Oriented x3] : oriented to person, place, and time

## 2021-05-03 ENCOUNTER — APPOINTMENT (OUTPATIENT)
Dept: ENDOCRINOLOGY | Facility: CLINIC | Age: 36
End: 2021-05-03
Payer: MEDICAID

## 2021-05-03 PROCEDURE — ZZZZZ: CPT

## 2021-05-09 LAB
ALBUMIN SERPL ELPH-MCNC: 3.8 G/DL
ALP BLD-CCNC: 85 U/L
ALT SERPL-CCNC: 11 U/L
ANION GAP SERPL CALC-SCNC: 11 MMOL/L
AST SERPL-CCNC: 16 U/L
BILIRUB SERPL-MCNC: 0.3 MG/DL
BUN SERPL-MCNC: 10 MG/DL
CALCIUM SERPL-MCNC: 8.5 MG/DL
CHLORIDE SERPL-SCNC: 101 MMOL/L
CHOLEST SERPL-MCNC: 186 MG/DL
CO2 SERPL-SCNC: 22 MMOL/L
CREAT SERPL-MCNC: 0.55 MG/DL
CREAT SPEC-SCNC: 68 MG/DL
ESTIMATED AVERAGE GLUCOSE: 128 MG/DL
FOLATE SERPL-MCNC: >20 NG/ML
GAD65 AB SER-MCNC: 0 NMOL/L
GLUCOSE SERPL-MCNC: 82 MG/DL
HBA1C MFR BLD HPLC: 6.1 %
HDLC SERPL-MCNC: 69 MG/DL
LDLC SERPL CALC-MCNC: 91 MG/DL
MICROALBUMIN 24H UR DL<=1MG/L-MCNC: <1.2 MG/DL
MICROALBUMIN/CREAT 24H UR-RTO: NORMAL MG/G
NONHDLC SERPL-MCNC: 117 MG/DL
POTASSIUM SERPL-SCNC: 5.1 MMOL/L
PROT SERPL-MCNC: 6.5 G/DL
SODIUM SERPL-SCNC: 135 MMOL/L
TRIGL SERPL-MCNC: 127 MG/DL
TSH SERPL-ACNC: 1.57 UIU/ML
VIT B12 SERPL-MCNC: 660 PG/ML

## 2021-05-10 ENCOUNTER — APPOINTMENT (OUTPATIENT)
Dept: ENDOCRINOLOGY | Facility: CLINIC | Age: 36
End: 2021-05-10
Payer: MEDICAID

## 2021-05-10 VITALS
HEART RATE: 91 BPM | SYSTOLIC BLOOD PRESSURE: 107 MMHG | WEIGHT: 124 LBS | DIASTOLIC BLOOD PRESSURE: 70 MMHG | BODY MASS INDEX: 23.43 KG/M2

## 2021-05-10 LAB — GLUCOSE BLDC GLUCOMTR-MCNC: 156

## 2021-05-10 PROCEDURE — 99214 OFFICE O/P EST MOD 30 MIN: CPT | Mod: 25

## 2021-05-10 PROCEDURE — 99072 ADDL SUPL MATRL&STAF TM PHE: CPT

## 2021-05-10 PROCEDURE — 82962 GLUCOSE BLOOD TEST: CPT

## 2021-05-10 RX ORDER — METFORMIN ER 500 MG 500 MG/1
500 TABLET ORAL
Qty: 90 | Refills: 3 | Status: ACTIVE | COMMUNITY
Start: 2021-05-10 | End: 1900-01-01

## 2021-05-10 NOTE — ASSESSMENT
[Carbohydrate Consistent Diet] : carbohydrate consistent diet [Importance of Diet and Exercise] : importance of diet and exercise to improve glycemic control, achieve weight loss and improve cardiovascular health [Self Monitoring of Blood Glucose] : self monitoring of blood glucose [FreeTextEntry1] : 35 y/o F pt with: \par \par 1. Gestational diabetes, 30 weeks pregnant\par Pt has made changes to her lifestyle and diet increasing her total  consumption of carbohydrates to approximately 160 resulted in high glucose excursion\par No history hypertension and urinalysis shows no protein.\par I discuss patient's high glucose excursions with a nutritionist today\par Options of treatment discussed including insulin injections and patient opted for metformin ( Maternal- fetus potential benefit and side explained including preeclampsia\par .Will initiate metformin 500 mg once at dinner continue monitoring with a postprandial glucose target of < 120 at 2 hs\par \par Return in 3 weeks\par \par

## 2021-05-10 NOTE — HISTORY OF PRESENT ILLNESS
[FreeTextEntry1] : 35 y/o female pt, 29 weeks pregnant, with recent diagnosed of gestational diabetes (dx in 04/2021), referred by Dr. Shelton Richter, presents today to establish endocrine care with me \par No other PMHx \par PSHx: Inguinal hernia surgery (2017), meniscus surgery (2017)\par FHx: DM(grandmother, aunt) \par Denies parents and siblings having hx of DM or thyroid disorders. \par SHx: non-smoker/no ETOH use \par Lifestyle: Physically active / Sedentary lifestyle.pt eats 3 meals and 1 snack every day. First meal is at 8AM. Last meal is at 8:30-9PM. Check BS 3x a day. \par NKDA \par Pt previously has ectopic pregnancy in 2013\par \par 04/30/2021\par Pt has went to hospital a few times during he pregnancy due to back pain. \par Pt had Glucose Screening/Tolerance Test during on approximately 04/06/21 and pt was informed by OBGYN that she has gestational diabetes. Pt was instructed to check BS a few times a day. \par Pt saw OBGYN 2 weeks after diagnosis and who informed her of high BS levels  After that pt changed her diet and her BS readings have improved. \par Pt reports she has seen a nutritionist recently. \par \par Today pt presents today, 29 weeks pregnant, for DM evaluation with POCT 83, /72 , BMI 23.43,  feeling well. \par Denies osmotic diuresis symptoms and pain/discomfort in LE. \par Pt states she consistently checks BS every day. Pt brought BS readings: \par BS review over past 5 days\par -FBS: 92, 96, 85, 98, 87 \par - Post prandial BS(taken 1 hour after meal): 104, 147, 102, 111, 139, 126 \par 5/10/21 Ectopic pregnancy in 2013\par She's 30 weeks pregnant diagnosed with gestational diabetes, prior to her visit with our team,she was consuming approximately 90 carbohydrates per day and she was maintaining adequate preop  and post glucose levels in target,which has increased after increasing calories and carbohydrates in her meals.\par On April 30 at A1c 6.1%, FIORELLA antibodies is negative , metabolic, urine and thyroid test were normal\par She made a diet and calories modifications .Taking 45 carbs breakfast, lunch and dinner and a snack between lunch and dinner.\par 2 hs post lunch and post dinner glucose readings has increase, they are in the 160's\par Patient is feeling well with no complaints\par \par Current Medications: Vit D, folic acid \par \par Labs: \par - 4/6/21: A1c 6.1%, \par - 1/11/21: TSH 0.89,

## 2021-05-10 NOTE — PHYSICAL EXAM
[Alert] : alert [Normal Retina] : normal retina [Normal Nasal Mucosa] : the nasal mucosa was normal [No Neck Mass] : no neck mass was observed [Clear to Auscultation] : lungs were clear to auscultation bilaterally [Normal Rate] : heart rate was normal [No Edema] : no peripheral edema [Soft] : abdomen soft [No Stigmata of Cushings Syndrome] : no stigmata of Cushings Syndrome [Normal Gait] : normal gait [No Rash] : no rash [Normal Reflexes] : deep tendon reflexes were 2+ and symmetric [Oriented x3] : oriented to person, place, and time

## 2021-05-17 ENCOUNTER — APPOINTMENT (OUTPATIENT)
Dept: ENDOCRINOLOGY | Facility: CLINIC | Age: 36
End: 2021-05-17
Payer: MEDICAID

## 2021-05-17 VITALS
WEIGHT: 126 LBS | HEART RATE: 89 BPM | HEIGHT: 61 IN | BODY MASS INDEX: 23.79 KG/M2 | SYSTOLIC BLOOD PRESSURE: 106 MMHG | DIASTOLIC BLOOD PRESSURE: 70 MMHG

## 2021-05-17 LAB — GLUCOSE BLDC GLUCOMTR-MCNC: 143

## 2021-05-17 PROCEDURE — 82962 GLUCOSE BLOOD TEST: CPT

## 2021-05-17 PROCEDURE — 99072 ADDL SUPL MATRL&STAF TM PHE: CPT

## 2021-05-17 PROCEDURE — 99213 OFFICE O/P EST LOW 20 MIN: CPT | Mod: 25

## 2021-05-18 ENCOUNTER — APPOINTMENT (OUTPATIENT)
Dept: MATERNAL FETAL MEDICINE | Facility: CLINIC | Age: 36
End: 2021-05-18

## 2021-05-18 ENCOUNTER — ASOB RESULT (OUTPATIENT)
Age: 36
End: 2021-05-18

## 2021-05-18 ENCOUNTER — APPOINTMENT (OUTPATIENT)
Dept: ANTEPARTUM | Facility: CLINIC | Age: 36
End: 2021-05-18
Payer: MEDICAID

## 2021-05-18 PROCEDURE — 99072 ADDL SUPL MATRL&STAF TM PHE: CPT

## 2021-05-18 PROCEDURE — 76816 OB US FOLLOW-UP PER FETUS: CPT

## 2021-05-18 PROCEDURE — 76819 FETAL BIOPHYS PROFIL W/O NST: CPT

## 2021-05-18 PROCEDURE — ZZZZZ: CPT

## 2021-05-18 RX ORDER — FLASH GLUCOSE SENSOR
KIT MISCELLANEOUS
Qty: 2 | Refills: 1 | Status: ACTIVE | COMMUNITY
Start: 2021-04-30 | End: 1900-01-01

## 2021-05-18 RX ORDER — FLASH GLUCOSE SCANNING READER
EACH MISCELLANEOUS
Qty: 1 | Refills: 0 | Status: ACTIVE | COMMUNITY
Start: 2021-04-30 | End: 1900-01-01

## 2021-05-24 ENCOUNTER — NON-APPOINTMENT (OUTPATIENT)
Age: 36
End: 2021-05-24

## 2021-05-24 ENCOUNTER — APPOINTMENT (OUTPATIENT)
Dept: OBGYN | Facility: CLINIC | Age: 36
End: 2021-05-24
Payer: MEDICAID

## 2021-05-24 VITALS — DIASTOLIC BLOOD PRESSURE: 60 MMHG | BODY MASS INDEX: 24.19 KG/M2 | WEIGHT: 128 LBS | SYSTOLIC BLOOD PRESSURE: 100 MMHG

## 2021-05-24 PROCEDURE — 0502F SUBSEQUENT PRENATAL CARE: CPT

## 2021-05-27 NOTE — ADDENDUM
[FreeTextEntry1] : 5/27/2021 AL\moira Spoke to pt via phone using  # [id number]. AM FSG 80-98. 2hr PP breakfast and lunch are <120. 2hr PP dinner run a little higher sometimes in 140s despite consistently eating the same amount of carbs at each dinner. Will continue metformin 500mg BID. Encouraged post dinner 30 minute walk. Will f/u with Dr Maurice in 2 weeks.

## 2021-05-27 NOTE — ASSESSMENT
[Carbohydrate Consistent Diet] : carbohydrate consistent diet [Importance of Diet and Exercise] : importance of diet and exercise to improve glycemic control, achieve weight loss and improve cardiovascular health [Hypoglycemia Management] : hypoglycemia management [Action and use of Insulin] : action and use of short and long-acting insulin [Self Monitoring of Blood Glucose] : self monitoring of blood glucose [Insulin Self-Administration] : insulin self-administration [Injection Technique, Storage, Sharps Disposal] : injection technique, storage, and sharps disposal [FreeTextEntry1] : 35 y/o female pt, 30 weeks pregnant, with recent diagnosed of gestational diabetes (dx in 04/2021).\par \par Maintaining BG goals most of the time. Will increase metformin to 500mg with breakfast and dinner. \par We reviewed glucose goals. We reviewed fetal and maternal risk with uncontrolled diabetes in pregnancy.\par Discussed insulin administration and reviewed injection technique in case we need to start in the future. She showed competence through return demonstration.\par How to administer insulin:\par 1. Wash hands.\par 2. Take off pen cap.\par 3. Remove paper tab from new needle and twist on until tight.\par 4. Prime Pen\par A.Dial 2 units B. remove outer needle cap C. remove inner needle cap D. hold pen with needle pointing upwards E.tap insulin reservoir to make any air bubbles rise up toward needle F.press injection button all the way in and make sure insulin comes out of needle tip. If not, repeat until it does.\par 5. Dial required dose.\par 6. Chose an area of your body to inject and clean skin with rubbing alcohol.\par 7. Hold pen like dagger. Lightly pinch a fold of skin and insert the needle straight into the pinched skin.\par 9. Press injection button all the way in to deliver the dose. The number in the dose window will return to "0" as you inject. Keep the injection button pressed in and slowly count to 10. Withdraw needle from your skin.\par 11. Remove the needle after each injection and store pen without needle attached. Dispose of needle as instructed. Replace cap on pen and store in a cool dry place.\par \par Discussed with Dr Maurice.\par I will call her on May 25th to review BG. \par Dr Maurice 6/15.\par

## 2021-05-27 NOTE — HISTORY OF PRESENT ILLNESS
[FreeTextEntry1] : 35 y/o female pt, 30 weeks pregnant, with recent diagnosed of gestational diabetes (dx in 04/2021).\par Patient of Dr Maurice. Presents today to assess BG and insulin administration education. Utilized  # [id number].\par \par Treatment: Started on metformin 500mg with dinner on 5/12. She reports mild stomach upset, but could also be from her gastritis.\par SMBG: Checks fasting and 2hr PP. Log reviewed. Fasting, breakfast, and lunch at target. Post dinner above target to 150s.\par 5/12: 111, 137, 108, 117\par 5/13: 95, 133, 106, 126\par 5/14: 86, 139, 117, 130\par 5/15: 87, 107, 108, 157\par 5/16: 64, 104, 134, 151\par 5/17: 75, 143.\par She reports Prerna is not covered. Will investigate further.\par Diet: She eats 3 meals and 1 afternoon snack per day. She aims for 36-40 gm carb for breakfast and lunch, and 20-30 gm carb for dinner due to elevated BG after dinner. She saw RD at last visit.\par

## 2021-05-27 NOTE — PHYSICAL EXAM
[Alert] : alert [Well Nourished] : well nourished [No Acute Distress] : no acute distress [Well Developed] : well developed [Normal Voice/Communication] : normal voice communication [Normal Sclera/Conjunctiva] : normal sclera/conjunctiva [EOMI] : extra ocular movement intact [No Proptosis] : no proptosis [Normal Oropharynx] : the oropharynx was normal [No Respiratory Distress] : no respiratory distress [No Accessory Muscle Use] : no accessory muscle use [Normal Rate and Effort] : normal respiratory rate and effort [Normal Rate] : heart rate was normal [Regular Rhythm] : with a regular rhythm [No Edema] : no peripheral edema [Normal Bowel Sounds] : normal bowel sounds [Not Tender] : non-tender [Not Distended] : not distended [Soft] : abdomen soft [Normal Anterior Cervical Nodes] : no anterior cervical lymphadenopathy [Normal Posterior Cervical Nodes] : no posterior cervical lymphadenopathy [No Spinal Tenderness] : no spinal tenderness [Spine Straight] : spine straight [No Stigmata of Cushings Syndrome] : no stigmata of Cushings Syndrome [Normal Gait] : normal gait [No Involuntary Movements] : no involuntary movements were seen [Normal Strength/Tone] : muscle strength and tone were normal [No Rash] : no rash [No Skin Lesions] : no skin lesions [No Tremors] : no tremors [Oriented x3] : oriented to person, place, and time [Normal Affect] : the affect was normal [Normal Insight/Judgement] : insight and judgment were intact [Normal Mood] : the mood was normal [Acanthosis Nigricans] : no acanthosis nigricans

## 2021-06-09 ENCOUNTER — NON-APPOINTMENT (OUTPATIENT)
Age: 36
End: 2021-06-09

## 2021-06-09 ENCOUNTER — APPOINTMENT (OUTPATIENT)
Dept: OBGYN | Facility: CLINIC | Age: 36
End: 2021-06-09
Payer: MEDICAID

## 2021-06-09 VITALS
SYSTOLIC BLOOD PRESSURE: 110 MMHG | BODY MASS INDEX: 24.2 KG/M2 | HEIGHT: 61 IN | HEART RATE: 83 BPM | DIASTOLIC BLOOD PRESSURE: 80 MMHG | WEIGHT: 128.19 LBS

## 2021-06-09 PROCEDURE — 0502F SUBSEQUENT PRENATAL CARE: CPT

## 2021-06-15 ENCOUNTER — ASOB RESULT (OUTPATIENT)
Age: 36
End: 2021-06-15

## 2021-06-15 ENCOUNTER — APPOINTMENT (OUTPATIENT)
Dept: ENDOCRINOLOGY | Facility: CLINIC | Age: 36
End: 2021-06-15
Payer: MEDICAID

## 2021-06-15 ENCOUNTER — APPOINTMENT (OUTPATIENT)
Dept: ANTEPARTUM | Facility: CLINIC | Age: 36
End: 2021-06-15
Payer: MEDICAID

## 2021-06-15 VITALS
WEIGHT: 130 LBS | HEART RATE: 83 BPM | BODY MASS INDEX: 24.56 KG/M2 | DIASTOLIC BLOOD PRESSURE: 75 MMHG | SYSTOLIC BLOOD PRESSURE: 108 MMHG

## 2021-06-15 LAB — GLUCOSE BLDC GLUCOMTR-MCNC: 116

## 2021-06-15 PROCEDURE — 82962 GLUCOSE BLOOD TEST: CPT

## 2021-06-15 PROCEDURE — 99215 OFFICE O/P EST HI 40 MIN: CPT | Mod: 25

## 2021-06-15 PROCEDURE — 76816 OB US FOLLOW-UP PER FETUS: CPT

## 2021-06-15 PROCEDURE — 76819 FETAL BIOPHYS PROFIL W/O NST: CPT

## 2021-06-18 NOTE — ADDENDUM
[FreeTextEntry1] : I, Jamie Macias, acted solely as a scribe for Dr. Tino Maurice on this date. 06/15/2021.

## 2021-06-18 NOTE — ASSESSMENT
[Carbohydrate Consistent Diet] : carbohydrate consistent diet [Importance of Diet and Exercise] : importance of diet and exercise to improve glycemic control, achieve weight loss and improve cardiovascular health [Self Monitoring of Blood Glucose] : self monitoring of blood glucose [FreeTextEntry1] : 35 y/o F pt with: \par \par 1. Gestational diabetes, 35 weeks pregnant\par Recent A1c 6.1% on 4/30/2021.\par Pt is very dedicated with her gestational diabetes treatment plans. Pt is doing well and feeling good. Review of pre and post prandial BS in target. No albuminuria and BP is normal.\par Assessing risk for preeclampsia\par Send labs including microalbumin/creatinine ratio. Contact pt with results. \par Brief discussion of post partum endocrine f/u. \par \par Return in 4 months

## 2021-06-18 NOTE — END OF VISIT
[FreeTextEntry3] : All medical record entries made by the Scribe were at my, Dr. Tino Maurice, direction and personally dictated by me on 06/15/2021. I have reviewed the chart and agree that the record accurately reflects my personal performance of the history, physical exam, assessment and plan. I have also personally directed, reviewed and agreed with the chart.  [Time Spent: ___ minutes] : I have spent [unfilled] minutes of time on the encounter.

## 2021-06-21 ENCOUNTER — APPOINTMENT (OUTPATIENT)
Dept: OBGYN | Facility: CLINIC | Age: 36
End: 2021-06-21
Payer: MEDICAID

## 2021-06-21 ENCOUNTER — NON-APPOINTMENT (OUTPATIENT)
Age: 36
End: 2021-06-21

## 2021-06-21 VITALS — WEIGHT: 131 LBS | DIASTOLIC BLOOD PRESSURE: 60 MMHG | SYSTOLIC BLOOD PRESSURE: 100 MMHG | BODY MASS INDEX: 24.75 KG/M2

## 2021-06-21 PROCEDURE — 0502F SUBSEQUENT PRENATAL CARE: CPT

## 2021-06-21 RX ORDER — VALACYCLOVIR 500 MG/1
500 TABLET, FILM COATED ORAL
Qty: 60 | Refills: 2 | Status: ACTIVE | COMMUNITY
Start: 2021-06-21 | End: 1900-01-01

## 2021-06-22 ENCOUNTER — NON-APPOINTMENT (OUTPATIENT)
Age: 36
End: 2021-06-22

## 2021-06-24 LAB — B-HEM STREP SPEC QL CULT: NORMAL

## 2021-06-28 ENCOUNTER — NON-APPOINTMENT (OUTPATIENT)
Age: 36
End: 2021-06-28

## 2021-06-28 ENCOUNTER — APPOINTMENT (OUTPATIENT)
Dept: OBGYN | Facility: CLINIC | Age: 36
End: 2021-06-28
Payer: MEDICAID

## 2021-06-28 VITALS — WEIGHT: 133 LBS | DIASTOLIC BLOOD PRESSURE: 60 MMHG | BODY MASS INDEX: 25.13 KG/M2 | SYSTOLIC BLOOD PRESSURE: 100 MMHG

## 2021-06-28 PROCEDURE — 0502F SUBSEQUENT PRENATAL CARE: CPT

## 2021-06-29 ENCOUNTER — ASOB RESULT (OUTPATIENT)
Age: 36
End: 2021-06-29

## 2021-06-29 ENCOUNTER — APPOINTMENT (OUTPATIENT)
Dept: ANTEPARTUM | Facility: CLINIC | Age: 36
End: 2021-06-29
Payer: MEDICAID

## 2021-06-29 PROCEDURE — 76819 FETAL BIOPHYS PROFIL W/O NST: CPT

## 2021-06-29 PROCEDURE — 76816 OB US FOLLOW-UP PER FETUS: CPT

## 2021-07-08 ENCOUNTER — NON-APPOINTMENT (OUTPATIENT)
Age: 36
End: 2021-07-08

## 2021-07-08 ENCOUNTER — APPOINTMENT (OUTPATIENT)
Dept: OBGYN | Facility: CLINIC | Age: 36
End: 2021-07-08
Payer: MEDICAID

## 2021-07-08 ENCOUNTER — OUTPATIENT (OUTPATIENT)
Dept: OUTPATIENT SERVICES | Facility: HOSPITAL | Age: 36
LOS: 1 days | End: 2021-07-08
Payer: COMMERCIAL

## 2021-07-08 VITALS — DIASTOLIC BLOOD PRESSURE: 60 MMHG | BODY MASS INDEX: 25.22 KG/M2 | WEIGHT: 133.5 LBS | SYSTOLIC BLOOD PRESSURE: 110 MMHG

## 2021-07-08 DIAGNOSIS — Z01.818 ENCOUNTER FOR OTHER PREPROCEDURAL EXAMINATION: ICD-10-CM

## 2021-07-08 LAB
ALBUMIN SERPL ELPH-MCNC: 3.6 G/DL
ALP BLD-CCNC: 123 U/L
ALT SERPL-CCNC: 16 U/L
ANION GAP SERPL CALC-SCNC: 13 MMOL/L
AST SERPL-CCNC: 19 U/L
BASOPHILS # BLD AUTO: 0.04 K/UL — SIGNIFICANT CHANGE UP (ref 0–0.2)
BASOPHILS NFR BLD AUTO: 0.5 % — SIGNIFICANT CHANGE UP (ref 0–2)
BILIRUB SERPL-MCNC: 0.2 MG/DL
BLD GP AB SCN SERPL QL: NEGATIVE — SIGNIFICANT CHANGE UP
BLD GP AB SCN SERPL QL: NEGATIVE — SIGNIFICANT CHANGE UP
BUN SERPL-MCNC: 10 MG/DL
CALCIUM SERPL-MCNC: 9.1 MG/DL
CHLORIDE SERPL-SCNC: 103 MMOL/L
CO2 SERPL-SCNC: 22 MMOL/L
CREAT SERPL-MCNC: 0.57 MG/DL
CREAT SPEC-SCNC: 62 MG/DL
EOSINOPHIL # BLD AUTO: 0.04 K/UL — SIGNIFICANT CHANGE UP (ref 0–0.5)
EOSINOPHIL NFR BLD AUTO: 0.5 % — SIGNIFICANT CHANGE UP (ref 0–6)
ESTIMATED AVERAGE GLUCOSE: 117 MG/DL
FOLATE SERPL-MCNC: >20 NG/ML
GLUCOSE SERPL-MCNC: 94 MG/DL
HBA1C MFR BLD HPLC: 5.7 %
HCT VFR BLD CALC: 39.2 % — SIGNIFICANT CHANGE UP (ref 34.5–45)
HGB BLD-MCNC: 13.2 G/DL — SIGNIFICANT CHANGE UP (ref 11.5–15.5)
IMM GRANULOCYTES NFR BLD AUTO: 0.5 % — SIGNIFICANT CHANGE UP (ref 0–1.5)
LYMPHOCYTES # BLD AUTO: 1.29 K/UL — SIGNIFICANT CHANGE UP (ref 1–3.3)
LYMPHOCYTES # BLD AUTO: 16.5 % — SIGNIFICANT CHANGE UP (ref 13–44)
MCHC RBC-ENTMCNC: 33.7 GM/DL — SIGNIFICANT CHANGE UP (ref 32–36)
MCHC RBC-ENTMCNC: 34.7 PG — HIGH (ref 27–34)
MCV RBC AUTO: 103.2 FL — HIGH (ref 80–100)
MICROALBUMIN 24H UR DL<=1MG/L-MCNC: <1.2 MG/DL
MICROALBUMIN/CREAT 24H UR-RTO: NORMAL MG/G
MONOCYTES # BLD AUTO: 0.74 K/UL — SIGNIFICANT CHANGE UP (ref 0–0.9)
MONOCYTES NFR BLD AUTO: 9.5 % — SIGNIFICANT CHANGE UP (ref 2–14)
NEUTROPHILS # BLD AUTO: 5.65 K/UL — SIGNIFICANT CHANGE UP (ref 1.8–7.4)
NEUTROPHILS NFR BLD AUTO: 72.5 % — SIGNIFICANT CHANGE UP (ref 43–77)
NRBC # BLD: 0 /100 WBCS — SIGNIFICANT CHANGE UP (ref 0–0)
PLATELET # BLD AUTO: 192 K/UL — SIGNIFICANT CHANGE UP (ref 150–400)
POTASSIUM SERPL-SCNC: 5.4 MMOL/L
PROT SERPL-MCNC: 6 G/DL
RBC # BLD: 3.8 M/UL — SIGNIFICANT CHANGE UP (ref 3.8–5.2)
RBC # FLD: 14.4 % — SIGNIFICANT CHANGE UP (ref 10.3–14.5)
RH IG SCN BLD-IMP: POSITIVE — SIGNIFICANT CHANGE UP
RH IG SCN BLD-IMP: POSITIVE — SIGNIFICANT CHANGE UP
SARS-COV-2 N GENE NPH QL NAA+PROBE: NOT DETECTED
SODIUM SERPL-SCNC: 138 MMOL/L
VIT B12 SERPL-MCNC: 586 PG/ML
WBC # BLD: 7.8 K/UL — SIGNIFICANT CHANGE UP (ref 3.8–10.5)
WBC # FLD AUTO: 7.8 K/UL — SIGNIFICANT CHANGE UP (ref 3.8–10.5)

## 2021-07-08 PROCEDURE — 86780 TREPONEMA PALLIDUM: CPT

## 2021-07-08 PROCEDURE — 86900 BLOOD TYPING SEROLOGIC ABO: CPT

## 2021-07-08 PROCEDURE — 0502F SUBSEQUENT PRENATAL CARE: CPT

## 2021-07-08 PROCEDURE — 86850 RBC ANTIBODY SCREEN: CPT

## 2021-07-08 PROCEDURE — 86901 BLOOD TYPING SEROLOGIC RH(D): CPT

## 2021-07-08 PROCEDURE — 85025 COMPLETE CBC W/AUTO DIFF WBC: CPT

## 2021-07-09 ENCOUNTER — INPATIENT (INPATIENT)
Facility: HOSPITAL | Age: 36
LOS: 2 days | Discharge: HOME | End: 2021-07-12
Attending: OBSTETRICS & GYNECOLOGY | Admitting: OBSTETRICS & GYNECOLOGY
Payer: COMMERCIAL

## 2021-07-09 ENCOUNTER — APPOINTMENT (OUTPATIENT)
Dept: ANTEPARTUM | Facility: CLINIC | Age: 36
End: 2021-07-09

## 2021-07-09 VITALS — WEIGHT: 127.87 LBS | HEIGHT: 60 IN | TEMPERATURE: 98 F

## 2021-07-09 LAB
BASOPHILS # BLD AUTO: 0.04 K/UL — SIGNIFICANT CHANGE UP (ref 0–0.2)
BASOPHILS NFR BLD AUTO: 0.5 % — SIGNIFICANT CHANGE UP (ref 0–2)
BLD GP AB SCN SERPL QL: NEGATIVE — SIGNIFICANT CHANGE UP
EOSINOPHIL # BLD AUTO: 0.08 K/UL — SIGNIFICANT CHANGE UP (ref 0–0.5)
EOSINOPHIL NFR BLD AUTO: 0.9 % — SIGNIFICANT CHANGE UP (ref 0–6)
GLUCOSE BLDC GLUCOMTR-MCNC: 98 MG/DL — SIGNIFICANT CHANGE UP (ref 70–99)
HCT VFR BLD CALC: 36.2 % — SIGNIFICANT CHANGE UP (ref 34.5–45)
HGB BLD-MCNC: 12.6 G/DL — SIGNIFICANT CHANGE UP (ref 11.5–15.5)
IMM GRANULOCYTES NFR BLD AUTO: 0.7 % — SIGNIFICANT CHANGE UP (ref 0–1.5)
LYMPHOCYTES # BLD AUTO: 1.61 K/UL — SIGNIFICANT CHANGE UP (ref 1–3.3)
LYMPHOCYTES # BLD AUTO: 18.3 % — SIGNIFICANT CHANGE UP (ref 13–44)
MCHC RBC-ENTMCNC: 34.6 PG — HIGH (ref 27–34)
MCHC RBC-ENTMCNC: 34.8 GM/DL — SIGNIFICANT CHANGE UP (ref 32–36)
MCV RBC AUTO: 99.5 FL — SIGNIFICANT CHANGE UP (ref 80–100)
MONOCYTES # BLD AUTO: 0.94 K/UL — HIGH (ref 0–0.9)
MONOCYTES NFR BLD AUTO: 10.7 % — SIGNIFICANT CHANGE UP (ref 2–14)
NEUTROPHILS # BLD AUTO: 6.09 K/UL — SIGNIFICANT CHANGE UP (ref 1.8–7.4)
NEUTROPHILS NFR BLD AUTO: 68.9 % — SIGNIFICANT CHANGE UP (ref 43–77)
NRBC # BLD: 0 /100 WBCS — SIGNIFICANT CHANGE UP (ref 0–0)
PLATELET # BLD AUTO: 203 K/UL — SIGNIFICANT CHANGE UP (ref 150–400)
RBC # BLD: 3.64 M/UL — LOW (ref 3.8–5.2)
RBC # FLD: 14 % — SIGNIFICANT CHANGE UP (ref 10.3–14.5)
RH IG SCN BLD-IMP: POSITIVE — SIGNIFICANT CHANGE UP
T PALLIDUM AB TITR SER: NEGATIVE — SIGNIFICANT CHANGE UP
WBC # BLD: 8.82 K/UL — SIGNIFICANT CHANGE UP (ref 3.8–10.5)
WBC # FLD AUTO: 8.82 K/UL — SIGNIFICANT CHANGE UP (ref 3.8–10.5)

## 2021-07-09 RX ORDER — OXYTOCIN 10 UNIT/ML
333.33 VIAL (ML) INJECTION
Qty: 20 | Refills: 0 | Status: DISCONTINUED | OUTPATIENT
Start: 2021-07-09 | End: 2021-07-10

## 2021-07-09 RX ORDER — CITRIC ACID/SODIUM CITRATE 300-500 MG
15 SOLUTION, ORAL ORAL EVERY 6 HOURS
Refills: 0 | Status: DISCONTINUED | OUTPATIENT
Start: 2021-07-09 | End: 2021-07-10

## 2021-07-09 RX ORDER — AMPICILLIN TRIHYDRATE 250 MG
2 CAPSULE ORAL ONCE
Refills: 0 | Status: DISCONTINUED | OUTPATIENT
Start: 2021-07-09 | End: 2021-07-09

## 2021-07-09 RX ORDER — AMPICILLIN TRIHYDRATE 250 MG
1 CAPSULE ORAL EVERY 4 HOURS
Refills: 0 | Status: DISCONTINUED | OUTPATIENT
Start: 2021-07-09 | End: 2021-07-09

## 2021-07-09 RX ORDER — SODIUM CHLORIDE 9 MG/ML
1000 INJECTION, SOLUTION INTRAVENOUS
Refills: 0 | Status: DISCONTINUED | OUTPATIENT
Start: 2021-07-09 | End: 2021-07-10

## 2021-07-09 RX ORDER — OXYTOCIN 10 UNIT/ML
1 VIAL (ML) INJECTION
Qty: 30 | Refills: 0 | Status: DISCONTINUED | OUTPATIENT
Start: 2021-07-09 | End: 2021-07-12

## 2021-07-09 RX ORDER — SODIUM CHLORIDE 9 MG/ML
1000 INJECTION INTRAMUSCULAR; INTRAVENOUS; SUBCUTANEOUS ONCE
Refills: 0 | Status: COMPLETED | OUTPATIENT
Start: 2021-07-09 | End: 2021-07-09

## 2021-07-09 RX ADMIN — Medication 1 MILLIUNIT(S)/MIN: at 23:25

## 2021-07-09 RX ADMIN — SODIUM CHLORIDE 1000 MILLILITER(S): 9 INJECTION INTRAMUSCULAR; INTRAVENOUS; SUBCUTANEOUS at 22:50

## 2021-07-10 ENCOUNTER — RESULT REVIEW (OUTPATIENT)
Age: 36
End: 2021-07-10

## 2021-07-10 LAB
COVID-19 SPIKE DOMAIN AB INTERP: NEGATIVE — SIGNIFICANT CHANGE UP
COVID-19 SPIKE DOMAIN ANTIBODY RESULT: 0.4 U/ML — SIGNIFICANT CHANGE UP
GLUCOSE BLDC GLUCOMTR-MCNC: 71 MG/DL — SIGNIFICANT CHANGE UP (ref 70–99)
GLUCOSE BLDC GLUCOMTR-MCNC: 78 MG/DL — SIGNIFICANT CHANGE UP (ref 70–99)
GLUCOSE BLDC GLUCOMTR-MCNC: 78 MG/DL — SIGNIFICANT CHANGE UP (ref 70–99)
GLUCOSE BLDC GLUCOMTR-MCNC: 86 MG/DL — SIGNIFICANT CHANGE UP (ref 70–99)
GLUCOSE BLDC GLUCOMTR-MCNC: 94 MG/DL — SIGNIFICANT CHANGE UP (ref 70–99)
SARS-COV-2 IGG+IGM SERPL QL IA: 0.4 U/ML — SIGNIFICANT CHANGE UP
SARS-COV-2 IGG+IGM SERPL QL IA: NEGATIVE — SIGNIFICANT CHANGE UP
T PALLIDUM AB TITR SER: NEGATIVE — SIGNIFICANT CHANGE UP

## 2021-07-10 PROCEDURE — 88307 TISSUE EXAM BY PATHOLOGIST: CPT | Mod: 26

## 2021-07-10 PROCEDURE — 59400 OBSTETRICAL CARE: CPT | Mod: U9

## 2021-07-10 RX ORDER — AER TRAVELER 0.5 G/1
1 SOLUTION RECTAL; TOPICAL EVERY 4 HOURS
Refills: 0 | Status: DISCONTINUED | OUTPATIENT
Start: 2021-07-10 | End: 2021-07-12

## 2021-07-10 RX ORDER — BENZOCAINE 10 %
1 GEL (GRAM) MUCOUS MEMBRANE EVERY 6 HOURS
Refills: 0 | Status: DISCONTINUED | OUTPATIENT
Start: 2021-07-10 | End: 2021-07-12

## 2021-07-10 RX ORDER — SODIUM CHLORIDE 9 MG/ML
3 INJECTION INTRAMUSCULAR; INTRAVENOUS; SUBCUTANEOUS EVERY 8 HOURS
Refills: 0 | Status: DISCONTINUED | OUTPATIENT
Start: 2021-07-10 | End: 2021-07-12

## 2021-07-10 RX ORDER — PRAMOXINE HYDROCHLORIDE 150 MG/15G
1 AEROSOL, FOAM RECTAL EVERY 4 HOURS
Refills: 0 | Status: DISCONTINUED | OUTPATIENT
Start: 2021-07-10 | End: 2021-07-12

## 2021-07-10 RX ORDER — OXYTOCIN 10 UNIT/ML
333.33 VIAL (ML) INJECTION
Qty: 20 | Refills: 0 | Status: DISCONTINUED | OUTPATIENT
Start: 2021-07-10 | End: 2021-07-12

## 2021-07-10 RX ORDER — OXYCODONE HYDROCHLORIDE 5 MG/1
5 TABLET ORAL ONCE
Refills: 0 | Status: DISCONTINUED | OUTPATIENT
Start: 2021-07-10 | End: 2021-07-12

## 2021-07-10 RX ORDER — DIPHENHYDRAMINE HCL 50 MG
25 CAPSULE ORAL EVERY 6 HOURS
Refills: 0 | Status: DISCONTINUED | OUTPATIENT
Start: 2021-07-10 | End: 2021-07-12

## 2021-07-10 RX ORDER — SODIUM CHLORIDE 9 MG/ML
1000 INJECTION, SOLUTION INTRAVENOUS
Refills: 0 | Status: DISCONTINUED | OUTPATIENT
Start: 2021-07-10 | End: 2021-07-11

## 2021-07-10 RX ORDER — FENTANYL/BUPIVACAINE/NS/PF 2MCG/ML-.1
250 PLASTIC BAG, INJECTION (ML) INJECTION
Refills: 0 | Status: DISCONTINUED | OUTPATIENT
Start: 2021-07-10 | End: 2021-07-12

## 2021-07-10 RX ORDER — SIMETHICONE 80 MG/1
80 TABLET, CHEWABLE ORAL EVERY 4 HOURS
Refills: 0 | Status: DISCONTINUED | OUTPATIENT
Start: 2021-07-10 | End: 2021-07-12

## 2021-07-10 RX ORDER — KETOROLAC TROMETHAMINE 30 MG/ML
30 SYRINGE (ML) INJECTION ONCE
Refills: 0 | Status: DISCONTINUED | OUTPATIENT
Start: 2021-07-10 | End: 2021-07-10

## 2021-07-10 RX ORDER — HYDROCORTISONE 1 %
1 OINTMENT (GRAM) TOPICAL EVERY 6 HOURS
Refills: 0 | Status: DISCONTINUED | OUTPATIENT
Start: 2021-07-10 | End: 2021-07-12

## 2021-07-10 RX ORDER — IBUPROFEN 200 MG
600 TABLET ORAL EVERY 6 HOURS
Refills: 0 | Status: COMPLETED | OUTPATIENT
Start: 2021-07-10 | End: 2022-06-08

## 2021-07-10 RX ORDER — OXYCODONE HYDROCHLORIDE 5 MG/1
5 TABLET ORAL
Refills: 0 | Status: DISCONTINUED | OUTPATIENT
Start: 2021-07-10 | End: 2021-07-12

## 2021-07-10 RX ORDER — TETANUS TOXOID, REDUCED DIPHTHERIA TOXOID AND ACELLULAR PERTUSSIS VACCINE, ADSORBED 5; 2.5; 8; 8; 2.5 [IU]/.5ML; [IU]/.5ML; UG/.5ML; UG/.5ML; UG/.5ML
0.5 SUSPENSION INTRAMUSCULAR ONCE
Refills: 0 | Status: COMPLETED | OUTPATIENT
Start: 2021-07-10

## 2021-07-10 RX ORDER — MAGNESIUM HYDROXIDE 400 MG/1
30 TABLET, CHEWABLE ORAL
Refills: 0 | Status: DISCONTINUED | OUTPATIENT
Start: 2021-07-10 | End: 2021-07-12

## 2021-07-10 RX ORDER — IBUPROFEN 200 MG
600 TABLET ORAL EVERY 6 HOURS
Refills: 0 | Status: DISCONTINUED | OUTPATIENT
Start: 2021-07-10 | End: 2021-07-12

## 2021-07-10 RX ORDER — ACETAMINOPHEN 500 MG
975 TABLET ORAL
Refills: 0 | Status: DISCONTINUED | OUTPATIENT
Start: 2021-07-10 | End: 2021-07-12

## 2021-07-10 RX ORDER — SODIUM CHLORIDE 9 MG/ML
1000 INJECTION, SOLUTION INTRAVENOUS
Refills: 0 | Status: DISCONTINUED | OUTPATIENT
Start: 2021-07-10 | End: 2021-07-10

## 2021-07-10 RX ORDER — LANOLIN
1 OINTMENT (GRAM) TOPICAL EVERY 6 HOURS
Refills: 0 | Status: DISCONTINUED | OUTPATIENT
Start: 2021-07-10 | End: 2021-07-12

## 2021-07-10 RX ORDER — DIBUCAINE 1 %
1 OINTMENT (GRAM) RECTAL EVERY 6 HOURS
Refills: 0 | Status: DISCONTINUED | OUTPATIENT
Start: 2021-07-10 | End: 2021-07-12

## 2021-07-10 RX ADMIN — Medication 30 MILLIGRAM(S): at 14:58

## 2021-07-10 RX ADMIN — Medication 975 MILLIGRAM(S): at 21:33

## 2021-07-10 RX ADMIN — Medication 1000 MILLIUNIT(S)/MIN: at 14:31

## 2021-07-10 RX ADMIN — SODIUM CHLORIDE 125 MILLILITER(S): 9 INJECTION, SOLUTION INTRAVENOUS at 09:09

## 2021-07-10 RX ADMIN — SODIUM CHLORIDE 125 MILLILITER(S): 9 INJECTION, SOLUTION INTRAVENOUS at 01:16

## 2021-07-10 RX ADMIN — Medication 975 MILLIGRAM(S): at 21:34

## 2021-07-11 RX ADMIN — Medication 975 MILLIGRAM(S): at 22:00

## 2021-07-11 RX ADMIN — Medication 975 MILLIGRAM(S): at 09:39

## 2021-07-11 RX ADMIN — Medication 600 MILLIGRAM(S): at 12:25

## 2021-07-11 RX ADMIN — Medication 975 MILLIGRAM(S): at 15:27

## 2021-07-11 RX ADMIN — Medication 600 MILLIGRAM(S): at 19:00

## 2021-07-11 RX ADMIN — Medication 600 MILLIGRAM(S): at 13:00

## 2021-07-11 RX ADMIN — Medication 600 MILLIGRAM(S): at 06:00

## 2021-07-11 RX ADMIN — Medication 600 MILLIGRAM(S): at 00:28

## 2021-07-11 RX ADMIN — Medication 975 MILLIGRAM(S): at 10:16

## 2021-07-11 RX ADMIN — Medication 1 TABLET(S): at 12:25

## 2021-07-11 RX ADMIN — SODIUM CHLORIDE 3 MILLILITER(S): 9 INJECTION INTRAMUSCULAR; INTRAVENOUS; SUBCUTANEOUS at 06:50

## 2021-07-11 RX ADMIN — Medication 975 MILLIGRAM(S): at 21:02

## 2021-07-11 RX ADMIN — Medication 600 MILLIGRAM(S): at 18:12

## 2021-07-11 RX ADMIN — Medication 975 MILLIGRAM(S): at 16:05

## 2021-07-11 RX ADMIN — Medication 600 MILLIGRAM(S): at 06:50

## 2021-07-11 RX ADMIN — Medication 975 MILLIGRAM(S): at 03:21

## 2021-07-11 RX ADMIN — SODIUM CHLORIDE 3 MILLILITER(S): 9 INJECTION INTRAMUSCULAR; INTRAVENOUS; SUBCUTANEOUS at 00:28

## 2021-07-11 NOTE — PROGRESS NOTE ADULT - ASSESSMENT
A/P yo 36y  s/p , PP#1 , stable  1. Pain: OPM  2. GI: Reg  3. :  Voiding  4. DVT prophylaxis: SCDs, ambulation  5. Dispo: PP#2

## 2021-07-11 NOTE — LACTATION INITIAL EVALUATION - LACTATION INTERVENTIONS
initiate/review safe skin-to-skin/initiate/review hand expression/initiate/review techniques for position and latch/initiate/review breast massage/compression/reviewed components of an effective feeding and at least 8 effective feedings per day required/reviewed importance of monitoring infant diapers, the breastfeeding log, and minimum output each day/reviewed feeding on demand/by cue at least 8 times a day

## 2021-07-11 NOTE — LACTATION INITIAL EVALUATION - NS LACT CON REASON FOR REQ
Baby was tbreast whenLC returned to room to assess breastfeeding.  Shallow latch  observed.  Discussed with pt, FOB provided translation,  importance of a deep latch when baby is at breast.  Reviewed with pt. proper placement of baby's nose to pt.'s nipple and encouraging pt. to open mouth wide and then bringing baby to breast.  Encouraged pt. to reposition baby.  Baby was repositioned but pt. not comfortable with hold and returned to cradle hold baby was able tolatch to breast and with minor adjustment a deep latch was achieved.  As baby continued to  suck she slipped to shallow latch.  Baby not repositioned at this time due to pt. comfortable with hold and not feeling discomfort.  Reviewed with parents toencourage skin to skin, observe for early feeding cues, breastfeed 8-12x/24 hrs., monitor voids and stools./primaparous mom/staff request

## 2021-07-12 ENCOUNTER — TRANSCRIPTION ENCOUNTER (OUTPATIENT)
Age: 36
End: 2021-07-12

## 2021-07-12 VITALS
OXYGEN SATURATION: 99 % | HEART RATE: 63 BPM | TEMPERATURE: 98 F | DIASTOLIC BLOOD PRESSURE: 65 MMHG | RESPIRATION RATE: 18 BRPM | SYSTOLIC BLOOD PRESSURE: 105 MMHG

## 2021-07-12 PROCEDURE — 82962 GLUCOSE BLOOD TEST: CPT

## 2021-07-12 PROCEDURE — 88307 TISSUE EXAM BY PATHOLOGIST: CPT

## 2021-07-12 PROCEDURE — 86769 SARS-COV-2 COVID-19 ANTIBODY: CPT

## 2021-07-12 PROCEDURE — 86850 RBC ANTIBODY SCREEN: CPT

## 2021-07-12 PROCEDURE — 85025 COMPLETE CBC W/AUTO DIFF WBC: CPT

## 2021-07-12 PROCEDURE — 86900 BLOOD TYPING SEROLOGIC ABO: CPT

## 2021-07-12 PROCEDURE — 90715 TDAP VACCINE 7 YRS/> IM: CPT

## 2021-07-12 PROCEDURE — 36415 COLL VENOUS BLD VENIPUNCTURE: CPT

## 2021-07-12 PROCEDURE — 86901 BLOOD TYPING SEROLOGIC RH(D): CPT

## 2021-07-12 PROCEDURE — 86780 TREPONEMA PALLIDUM: CPT

## 2021-07-12 RX ORDER — ACETAMINOPHEN 500 MG
3 TABLET ORAL
Qty: 0 | Refills: 0 | DISCHARGE
Start: 2021-07-12

## 2021-07-12 RX ORDER — TETANUS TOXOID, REDUCED DIPHTHERIA TOXOID AND ACELLULAR PERTUSSIS VACCINE, ADSORBED 5; 2.5; 8; 8; 2.5 [IU]/.5ML; [IU]/.5ML; UG/.5ML; UG/.5ML; UG/.5ML
0.5 SUSPENSION INTRAMUSCULAR ONCE
Refills: 0 | Status: COMPLETED | OUTPATIENT
Start: 2021-07-12 | End: 2021-07-12

## 2021-07-12 RX ORDER — IBUPROFEN 200 MG
1 TABLET ORAL
Qty: 0 | Refills: 0 | DISCHARGE
Start: 2021-07-12

## 2021-07-12 RX ORDER — BENZOCAINE 10 %
1 GEL (GRAM) MUCOUS MEMBRANE
Qty: 0 | Refills: 0 | DISCHARGE
Start: 2021-07-12

## 2021-07-12 RX ADMIN — Medication 600 MILLIGRAM(S): at 12:21

## 2021-07-12 RX ADMIN — Medication 975 MILLIGRAM(S): at 09:40

## 2021-07-12 RX ADMIN — Medication 1 TABLET(S): at 12:20

## 2021-07-12 RX ADMIN — TETANUS TOXOID, REDUCED DIPHTHERIA TOXOID AND ACELLULAR PERTUSSIS VACCINE, ADSORBED 0.5 MILLILITER(S): 5; 2.5; 8; 8; 2.5 SUSPENSION INTRAMUSCULAR at 12:28

## 2021-07-12 RX ADMIN — Medication 600 MILLIGRAM(S): at 13:00

## 2021-07-12 RX ADMIN — Medication 975 MILLIGRAM(S): at 09:10

## 2021-07-12 RX ADMIN — Medication 600 MILLIGRAM(S): at 06:43

## 2021-07-12 NOTE — DISCHARGE NOTE OB - MEDICATION SUMMARY - MEDICATIONS TO STOP TAKING
I will STOP taking the medications listed below when I get home from the hospital:    terconazole 0.4% vaginal cream  -- 1 applicatorful intravaginally once a day (at bedtime)   -- For vaginal use.    metroNIDAZOLE 0.75% topical gel  -- Apply on skin to affected area once a day   -- For external use only.    metroNIDAZOLE 0.75% vaginal gel with applicator  -- 1 applicatorful intravaginally once a day (at bedtime)   -- For vaginal use.    Lidoderm 5% topical film  -- Apply on skin to affected area once a day   -- For external use only.  Remove old patch prior to applying a new patch.    butalbital/acetaminophen/caffeine 50 mg-325 mg-40 mg oral tablet  -- 1 tab(s) by mouth every 6 hours, As Needed for headache  -- Do not drink alcoholic beverages when taking this medication.  May cause drowsiness.  Alcohol may intensify this effect.  Use care when operating dangerous machinery.  This product contains acetaminophen.  Do not use  with any other product containing acetaminophen to prevent possible liver damage.

## 2021-07-12 NOTE — PROGRESS NOTE ADULT - ASSESSMENT
A/P 36y s/p , PPD 2  , stable, meeting postpartum milestones   - Pain: well controlled on oral pain meds  - GI: Tolerating regular diet  - : urinating without difficulty/pain  -DVT prophylaxis: ambulating frequently  -Dispo: PPD 2, unless otherwise specified

## 2021-07-12 NOTE — PROGRESS NOTE ADULT - SUBJECTIVE AND OBJECTIVE BOX
Patient evaluated at bedside.   She reports pain is well controlled with oral NSAID's.   She denies headache, dizziness, chest pain, palpitations, shortness of breath, nausea, vomiting, heavy vaginal bleeding or perineal discomfort.  She has been ambulating without assistance, voiding spontaneously, and is breastfeeding.  Tolerating regular diet    Physical Exam:  VSS:     GA: NAD, A+0 x 3  CV: RRR  Pulm: CTAB  Breasts: soft, nontender, no palpable masses  Abd: + BS, soft, nontender, nondistended, no rebound or guarding, uterus firm at midline, 4 fb below umbilicus  : lochia WNL; Perineum: healing wel.  No hematoma or ecchymosis  Extremities: no swelling or calf tenderness, reflexes +2 bilaterally                            12.6   8.82  )-----------(       ( 2021 22:42 )             36.2           Assessment:   PPD #  1 S/P     Plan:  Supportive Care  Diet as tolerated  Oral pain Meds  Stable for discharge home today  
Patient evaluated at bedside.      She reports pain is well controlled with medications.     She has been ambulating without assistance, voiding spontaneously, and is breastfeeding.     She denies HA, dizziness, chest pain, palpitations, shortness of breath, n/v, heavy vaginal bleeding or perineal discomfort.    Physical Exam:  Vital Signs Last 24 Hrs  T(C): 36.7 (11 Jul 2021 05:58), Max: 37.4 (10 Jul 2021 15:15)  T(F): 98.1 (11 Jul 2021 05:58), Max: 99.3 (10 Jul 2021 15:15)  HR: 68 (11 Jul 2021 05:58) (61 - 86)  BP: 101/66 (11 Jul 2021 05:58) (100/67 - 128/79)  BP(mean): --  RR: 18 (11 Jul 2021 05:58) (15 - 18)  SpO2: 98% (11 Jul 2021 05:58) (97% - 99%)    GA: NAD, A+0 x 3  Abd: + BS, soft, nontender, nondistended, no rebound or guarding, uterus firm at midline  : lochia WNL  Extremities: no edema or calf tenderness                          12.6   8.82  )-----------( 203      ( 09 Jul 2021 22:42 )             36.2               
Patient evaluated at bedside this morning, resting comfortable in bed, no acute events overnight.  She reports pain is well controlled with tylenol and motrin  She denies headache, dizziness, chest pain, palpitations, shortness of breath, nausea, vomiting, heavy vaginal bleeding or perineal discomfort. Reports decrease in amount of vaginal bleeding and denies clots.  She has been ambulating without assistance, voiding spontaneously, and is breastfeeding.   Tolerating food well, without nausea/vomit.  Passing flatus.     Physical Exam:  T(C): 36.4 (07-12-21 @ 05:23), Max: 36.4 (07-12-21 @ 05:23)  HR: 63 (07-12-21 @ 05:23) (63 - 63)  BP: 105/65 (07-12-21 @ 05:23) (105/65 - 105/65)  RR: 18 (07-12-21 @ 05:23) (18 - 18)  SpO2: 99% (07-12-21 @ 05:23) (99% - 99%)    GA: NAD, A&O x 3  Pulm: normal respiratory rate  Abd: + BS, soft, nontender, nondistended, no rebound or guarding, uterus firm at midline and 2  fb below umbilicus            acetaminophen   Tablet .. 975 milliGRAM(s) Oral <User Schedule>  benzocaine 20%/menthol 0.5% Spray 1 Spray(s) Topical every 6 hours PRN  dibucaine 1% Ointment 1 Application(s) Topical every 6 hours PRN  diphenhydrAMINE 25 milliGRAM(s) Oral every 6 hours PRN  diphtheria/tetanus/pertussis (acellular) Vaccine (ADAcel) 0.5 milliLiter(s) IntraMuscular once  fentanyl (2 MICROgram(s)/mL) + bupivacaine 0.1% in 0.9% Sodium Chloride PCEA 250 milliLiter(s) Epidural PCA Continuous  hydrocortisone 1% Cream 1 Application(s) Topical every 6 hours PRN  ibuprofen  Tablet. 600 milliGRAM(s) Oral every 6 hours  lanolin Ointment 1 Application(s) Topical every 6 hours PRN  magnesium hydroxide Suspension 30 milliLiter(s) Oral two times a day PRN  oxyCODONE    IR 5 milliGRAM(s) Oral once PRN  oxyCODONE    IR 5 milliGRAM(s) Oral every 3 hours PRN  oxytocin Infusion 333.333 milliUNIT(s)/Min IV Continuous <Continuous>  oxytocin Infusion. 1 milliUNIT(s)/Min IV Continuous <Continuous>  pramoxine 1%/zinc 5% Cream 1 Application(s) Topical every 4 hours PRN  prenatal multivitamin 1 Tablet(s) Oral daily  simethicone 80 milliGRAM(s) Chew every 4 hours PRN  sodium chloride 0.9% lock flush 3 milliLiter(s) IV Push every 8 hours  witch hazel Pads 1 Application(s) Topical every 4 hours PRN

## 2021-07-12 NOTE — DISCHARGE NOTE OB - MATERIALS PROVIDED
Vaccinations/[facility]  Screening Program/  Immunization Record/Breastfeeding Log/Bottle Feeding Log/Breastfeeding Mother’s Support Group Information/Guide to Postpartum Care/[facility] Hearing Screen Program/Back To Sleep Handout/Shaken Baby Prevention Handout/Breastfeeding Guide and Packet/Birth Certificate Instructions/Discharge Medication Information for Patients and Families Pocket Guide/Tdap Vaccination (VIS Pub Date: 2012)

## 2021-07-12 NOTE — DISCHARGE NOTE OB - CARE PROVIDER_API CALL
Shelton Richter)  Obstetrics and Gynecology  [address], [facility], [place] [address]  Phone: [phone]  Fax: [phone]  Follow Up Time: 1 month

## 2021-07-12 NOTE — DISCHARGE NOTE OB - PATIENT PORTAL LINK FT
You can access the FollowMyHealth Patient Portal offered by [facility] by registering at the following website: [email or web address]. By joining [facility]’s FollowMyHealth portal, you will also be able to view your health information using other applications (apps) compatible with our system.

## 2021-07-12 NOTE — DISCHARGE NOTE OB - BIRTH CERTIFICATE COMPLETED
Yes
[facility]  Screening Program/[facility] Hearing Screen Program/MMR Vaccination (VIS Pub Date: 2012)/Tdap Vaccination (VIS Pub Date: 2012)/Breastfeeding Mother’s Support Group Information/Guide to Postpartum Care/Shaken Baby Prevention Handout/Breastfeeding Log/Breastfeeding Guide and Packet/Birth Certificate Instructions/Discharge Medication Information for Patients and Families Pocket Guide/[place]  Immunization Record

## 2021-07-12 NOTE — DISCHARGE NOTE OB - FLU SEASON?
Cataract symptoms i.e., glare, blur discussed. Pt to call if worsening vision or trouble with driving, TV, reading, ADL. UV precautions. Reviewed possibility of future cataract surgery. No

## 2021-07-18 DIAGNOSIS — Z3A.39 39 WEEKS GESTATION OF PREGNANCY: ICD-10-CM

## 2021-07-18 DIAGNOSIS — Z67.40 TYPE O BLOOD, RH POSITIVE: ICD-10-CM

## 2021-07-18 DIAGNOSIS — Z23 ENCOUNTER FOR IMMUNIZATION: ICD-10-CM

## 2021-07-19 LAB — SURGICAL PATHOLOGY STUDY: SIGNIFICANT CHANGE UP

## 2021-07-22 ENCOUNTER — NON-APPOINTMENT (OUTPATIENT)
Age: 36
End: 2021-07-22

## 2021-08-23 ENCOUNTER — APPOINTMENT (OUTPATIENT)
Dept: OBGYN | Facility: CLINIC | Age: 36
End: 2021-08-23
Payer: MEDICAID

## 2021-08-23 VITALS — SYSTOLIC BLOOD PRESSURE: 110 MMHG | WEIGHT: 112 LBS | DIASTOLIC BLOOD PRESSURE: 80 MMHG | BODY MASS INDEX: 21.16 KG/M2

## 2021-08-23 DIAGNOSIS — Z86.32 PERSONAL HISTORY OF GESTATIONAL DIABETES: ICD-10-CM

## 2021-08-23 DIAGNOSIS — Z34.90 ENCOUNTER FOR SUPERVISION OF NORMAL PREGNANCY, UNSPECIFIED, UNSPECIFIED TRIMESTER: ICD-10-CM

## 2021-08-23 PROCEDURE — 0503F POSTPARTUM CARE VISIT: CPT

## 2021-08-23 RX ORDER — VALACYCLOVIR 500 MG/1
500 TABLET, FILM COATED ORAL
Qty: 10 | Refills: 6 | Status: ACTIVE | COMMUNITY
Start: 2021-08-23 | End: 1900-01-01

## 2021-08-23 NOTE — HISTORY OF PRESENT ILLNESS
[Postpartum Follow Up] : postpartum follow up [Delivery Date: ___] : on [unfilled] [Female] : Delivery History: baby girl [Breastfeeding] : currently nursing [Complications:___] : no complications [] : delivered by vaginal delivery [S/Sx PP Depression] : no signs/symptoms of postpartum depression [Erythema] : not erythematous [Back to Normal] : is back to normal in size [Mild] : mild vaginal bleeding [Normal] : the vagina was normal [Cervix Sample Taken] : cervical sample not taken for a Pap smear [Not Done] : Examination of breasts not done [Doing Well] : is doing well [No Sign of Infection] : is showing no signs of infection [Excellent Pain Control] : has excellent pain control [None] : None [FreeTextEntry8] : Follow up six weeks post partum exam.

## 2021-08-30 LAB — CYTOLOGY CVX/VAG DOC THIN PREP: NORMAL

## 2021-09-20 ENCOUNTER — EMERGENCY (EMERGENCY)
Facility: HOSPITAL | Age: 36
LOS: 1 days | Discharge: HOME | End: 2021-09-20
Attending: EMERGENCY MEDICINE | Admitting: EMERGENCY MEDICINE
Payer: MEDICAID

## 2021-09-20 VITALS
TEMPERATURE: 99 F | OXYGEN SATURATION: 98 % | SYSTOLIC BLOOD PRESSURE: 119 MMHG | WEIGHT: 110.01 LBS | HEIGHT: 60 IN | DIASTOLIC BLOOD PRESSURE: 88 MMHG | HEART RATE: 100 BPM

## 2021-09-20 DIAGNOSIS — R14.0 ABDOMINAL DISTENSION (GASEOUS): ICD-10-CM

## 2021-09-20 LAB
BASOPHILS # BLD AUTO: 0.04 K/UL — SIGNIFICANT CHANGE UP (ref 0–0.2)
BASOPHILS NFR BLD AUTO: 0.4 % — SIGNIFICANT CHANGE UP (ref 0–2)
EOSINOPHIL # BLD AUTO: 0.19 K/UL — SIGNIFICANT CHANGE UP (ref 0–0.5)
EOSINOPHIL NFR BLD AUTO: 1.8 % — SIGNIFICANT CHANGE UP (ref 0–6)
HCT VFR BLD CALC: 44 % — SIGNIFICANT CHANGE UP (ref 34.5–45)
HGB BLD-MCNC: 14.9 G/DL — SIGNIFICANT CHANGE UP (ref 11.5–15.5)
IMM GRANULOCYTES NFR BLD AUTO: 0.2 % — SIGNIFICANT CHANGE UP (ref 0–1.5)
LYMPHOCYTES # BLD AUTO: 2.11 K/UL — SIGNIFICANT CHANGE UP (ref 1–3.3)
LYMPHOCYTES # BLD AUTO: 20.1 % — SIGNIFICANT CHANGE UP (ref 13–44)
MCHC RBC-ENTMCNC: 33.2 PG — SIGNIFICANT CHANGE UP (ref 27–34)
MCHC RBC-ENTMCNC: 33.9 GM/DL — SIGNIFICANT CHANGE UP (ref 32–36)
MCV RBC AUTO: 98 FL — SIGNIFICANT CHANGE UP (ref 80–100)
MONOCYTES # BLD AUTO: 1 K/UL — HIGH (ref 0–0.9)
MONOCYTES NFR BLD AUTO: 9.5 % — SIGNIFICANT CHANGE UP (ref 2–14)
NEUTROPHILS # BLD AUTO: 7.14 K/UL — SIGNIFICANT CHANGE UP (ref 1.8–7.4)
NEUTROPHILS NFR BLD AUTO: 68 % — SIGNIFICANT CHANGE UP (ref 43–77)
NRBC # BLD: 0 /100 WBCS — SIGNIFICANT CHANGE UP (ref 0–0)
PLATELET # BLD AUTO: 282 K/UL — SIGNIFICANT CHANGE UP (ref 150–400)
RBC # BLD: 4.49 M/UL — SIGNIFICANT CHANGE UP (ref 3.8–5.2)
RBC # FLD: 12.1 % — SIGNIFICANT CHANGE UP (ref 10.3–14.5)
WBC # BLD: 10.5 K/UL — SIGNIFICANT CHANGE UP (ref 3.8–10.5)
WBC # FLD AUTO: 10.5 K/UL — SIGNIFICANT CHANGE UP (ref 3.8–10.5)

## 2021-09-20 PROCEDURE — 99284 EMERGENCY DEPT VISIT MOD MDM: CPT

## 2021-09-20 RX ORDER — SODIUM CHLORIDE 9 MG/ML
1000 INJECTION INTRAMUSCULAR; INTRAVENOUS; SUBCUTANEOUS ONCE
Refills: 0 | Status: COMPLETED | OUTPATIENT
Start: 2021-09-20 | End: 2021-09-20

## 2021-09-20 RX ORDER — HYOSCYAMINE SULFATE 0.13 MG
0.12 TABLET ORAL ONCE
Refills: 0 | Status: COMPLETED | OUTPATIENT
Start: 2021-09-20 | End: 2021-09-20

## 2021-09-20 NOTE — ED ADULT TRIAGE NOTE - HISTORY OF COVID-19 VACCINATION
CHIEF COMPLAINT:  Flex Walker is a 71 year old coming in for a Medicare Wellness Visit.    HISTORY OF PRESENT ILLNESS:  Patient is coming in for a Medicare Wellness Visit.    Medicare annual wellness visit, subsequent:  On multivitamin supplements.   Denies any issues with current medications.  Does not wear seatbelt typically.  Had a UTI in March 2021; it has resolved. Had associated tingling per genitalia. PSA done at the time was normal.  Current with both doses of covid-19 vaccines. Last dose was mid-April.  Depression questionnaire score was 0.   Current with Advance directives completion.   Patient is able to do all activities of daily living without limitations.   Colon cancer screening: Up to date with colonoscopy. Last one was in 2014; due in 2024.   Does not need medication refills currently.     Hypothyroidism, unspecified type: On medication for the same.     Essential hypertension: On Amlodipine 10 mg.    Coronary artery calcification seen on CT scan: On Lipitor.    Additional comments:  Has had turp procedure done earlier; denies any issues.    No memory issues based on direct observation.    Please see medicare health risk assessment questionnaire for further details.    11j.) Driven/Ridden in a car without wearing your seatbelt: Always     14.) During the past 4 weeks, was someone available to help if you needed and wanted help?: No, not at all         Patient Care Team:  Guanako Pulido MD as PCP - General (Family Practice)  Luis A Cevallos MD as Pulmonary Medicine (Internal Medicine - Pulmonary Disease)  Flex Roberts MD as Urologist (Urology)    Past Medical History:   Diagnosis Date   • Cataracts, bilateral    • Central serous chorioretinopathy of left eye    • HTN (hypertension)    • Hypothyroidism    • Macular degeneration of both eyes    • Urinary tract infection         Past Surgical History:   Procedure Laterality Date   • Colonoscopy diagnostic  08/28/2009,  8/15/14    repeat in 10 years ()   • Transurethral resection of prostate N/A 2015        Current Outpatient Medications   Medication Sig Dispense Refill   • atorvastatin (LIPITOR) 40 MG tablet Take 20 mg by mouth daily.     • amLODIPine (NORVASC) 10 MG tablet Take 1 tablet by mouth nightly. 90 tablet 3   • Multiple Vitamins-Minerals (MULTIVITAMIN PO) Take 1 tablet by mouth daily.     • levothyroxine (SYNTHROID, LEVOTHROID) 88 MCG tablet Take 88 mcg by mouth daily.       No current facility-administered medications for this visit.        ALLERGIES:  Patient has no known allergies.     Family History   Problem Relation Age of Onset   • COPD Mother         emphysema  age 92    • Heart disease Father 89         at age 89 in sleep thought was heart related.   • Cancer Sister         cancer not sure of what type   • Diabetes Neg Hx    • Stroke Neg Hx         Social History     Socioeconomic History   • Marital status:      Spouse name: Not on file   • Number of children: 2   • Years of education: Not on file   • Highest education level: Not on file   Occupational History   • Occupation: [employer]   Tobacco Use   • Smoking status: Former Smoker     Packs/day: 0.50     Years: 8.00     Pack years: 4.00     Types: Cigarettes     Quit date: 1983     Years since quittin.3   • Smokeless tobacco: Never Used   Substance and Sexual Activity   • Alcohol use: Yes     Alcohol/week: 1.0 - 2.0 standard drinks     Types: 1 - 2 Cans of beer per week   • Drug use: No   • Sexual activity: Not on file   Other Topics Concern   •  Service Not Asked   • Blood Transfusions Not Asked   • Caffeine Concern Not Asked   • Occupational Exposure Not Asked   • Hobby Hazards Not Asked   • Sleep Concern Not Asked   • Stress Concern Not Asked   • Weight Concern Not Asked   • Special Diet Not Asked   • Back Care Not Asked   • Exercise Not Asked   • Bike Helmet Not Asked   • Seat Belt No   • Self-Exams Not Asked    Social History Narrative    Originally from area.     Social Determinants of Health     Financial Resource Strain:    • Social Determinants: Financial Resource Strain:    Food Insecurity:    • Social Determinants: Food Insecurity:    Transportation Needs:    • Lack of Transportation (Medical):    • Lack of Transportation (Non-Medical):    Physical Activity:    • Days of Exercise per Week:    • Minutes of Exercise per Session:    Stress:    • Social Determinants: Stress:    Social Connections:    • Social Determinants: Social Connections:    Intimate Partner Violence: Not At Risk   • Social Determinants: Intimate Partner Violence Past Fear: No   • Social Determinants: Intimate Partner Violence Current Fear: No        REVIEW OF SYSTEMS:    Constitutional: No unusual weight changes, fever or chills.  Eyes: No changes in the vision.  ENT: No nasal problems or issues with the throat. No changes in hearing or difficulty swallowing.  Respiratory: No difficulty breathing, shortness of breath, or cough.  Cardiovascular: No chest pain.  Gastrointestinal: No heartburn, nausea, vomiting, diarrhea, constipation, hematochezia, or melena.  Genitourinary: No urinary complaints.   Musculoskeletal: No muscle or joint pain.   Neurological: No numbness, weakness or tingling.  Endocrine: No heat or cold intolerance, polyuria or polydipsia.  Skin: No skin changes.  Psychiatry: No anxiety or depression.    LAB RESULTS:  pending    PHYSICAL EXAM:    Constitutional: Alert, in no acute distress.  Eyes: No discharge, normal conjunctiva, no eyelid swelling, no ptosis and the sclerae were normal. Pupils equal, round and reactive to light and accommodation, conjugate gaze and extraocular movements were intact.  ENT: Normal appearing outer ear, normal appearing nose. Examination of the tympanic membrane showed normal landmarks, normal appearing external canal. Nasal mucosa moist and pink, no nasal discharge. Normal lips. Oral mucosa pink and  moist, no oral lesions.  Neck: Normal appearing, supple neck and no mass was seen. Thyroid not enlarged and no thyroid nodules. No lymphadenopathy.  Pulmonary: No respiratory distress, normal respiratory rate and effort and no accessory muscle use. Breath sounds clear to auscultation bilaterally.  Cardiovascular: Normal rate, no murmurs,, regular rhythm, normal S1 and normal S2. Edema was not present in the lower extremities.  Abdomen: Soft, nontender, nondistended, normal bowel sounds and no abdominal mass. No hepatomegaly and no splenomegaly. No umbilical hernia was seen.  Musculoskeletal: Normal gait. No musculoskeletal erythema was seen, no joint swelling seen and no joint tenderness was elicited. No scoliosis. Normal range of motion. There was no joint instability noted. Muscle strength and tone were normal.  Neurological: cranial nerves II-XII are grossly intact. Reflexes are normal. No sensory changes.   Psychiatric: Oriented to person, oriented to place and oriented to time. Interactive and mood/affect were appropriate. Judgment not impaired. Normal attention span. Short term memory intact.  Skin, Hair, Nails: Normal skin color and pigmentation and no rash. No foot ulcers and no skin ulcer was seen. Normal skin turgor. No clubbing or cyanosis of the fingernails.  Rectal: Normal sphincter tone, no masses or polyps felt. Prostate- non-tender, no asymmetry, no prostatic nodules felt, not enlarged.     Visit Vitals  /80   Pulse (!) 56   Resp 20   Ht 5' 6.5\" (1.689 m)   Wt 72.6 kg (160 lb 1.6 oz)   BMI 25.45 kg/m²        ASSESSMENT/PLAN:  1. Medicare annual wellness visit, subsequent    2. Hypothyroidism, unspecified type    3. Essential hypertension    4. Coronary artery calcification seen on CT scan        PLAN:    Medicare annual wellness visit, subsequent:  Recent labs reviewed and discussed in detail.  Ordered labs; refer to orders.   Past medical, surgical, social and family history, allergies,  reviewed, and updated.  Current with Advance directives completion.   Advised to use a seatbelt when traveling by car and use sunscreen when going outdoors.  Discussed Covid-19 vaccine and immunity.  Current medications reviewed and reconciled. Continue current medications.  Advised and encouraged regarding a healthy diet and exercise routine. Maintain a healthy weight.  Decrease intake of simple carbs, refined sugars, simple and processed foods. Eat more whole grains, fruits and vegetables. Advised to restrict intake of saturated fats and encouraged intake of proteins from lean meat. Encouraged using cooking oil which are rich in Omega 3 fatty acids.  Discussed and reviewed advanced directives.   Medication changes, if needed, to be decided after labs.     Hypothyroidism, unspecified type:  Recent labs reviewed and discussed in detail.  Ordered labs; refer to orders.   Current medications reviewed and reconciled. Continue current medications.      Essential hypertension:  Recent labs reviewed and discussed in detail.  Ordered labs; refer to orders.   Current medications reviewed and reconciled. Continue current medications.      Coronary artery calcification seen on CT scan:  Recent labs reviewed and discussed in detail.  Ordered labs; refer to orders.   Current medications reviewed and reconciled. Continue current medications.        Body mass index is 25.45 kg/m².    BMI ASSESSMENT/PLAN:  Patient is overweight.    Caloric restriction       Over the last 2 weeks, how often have you been bothered by the following problems?          PHQ2 Score: 0  PHQ2 Score Interpretation: No further screening needed  1. Little interest or pleasure in activity?: 0  2. Feeling down, depressed, or hopeless?: 0         DEPRESSION ASSESSMENT/PLAN:  Depression screening is negative no further plan needed.    ORDERS:    Orders Placed This Encounter   • Lipid Panel With Reflex   • Urinalysis With Microscopy & Culture If Indicated   •  Comprehensive Metabolic Panel   • CBC with Automated Differential   • Thyroid Stimulating Hormone Reflex       Return in about 1 year (around 5/27/2022) for Medicare Wellness Visit, needs labs today.    Medical compliance with plan discussed and risks of non-compliance reviewed.  Patient education completed on disease process, etiology & prognosis.  Proper usage and side effects of medications reviewed & discussed.  Patient understands and agrees with the plan.  Return to clinic as clinically indicated as discussed with patient who verbalized understanding of the plan and is in agreement with the plan.    I, Jonathan Ferreira, have created a visit summary document based on the audio recording between Guanako Pulido MD and this patient for the physician to review, edit as needed, and authenticate.  Creation Date: 5/29/2021      I have reviewed and edited the visit summary above and attest that it is accurate.           No

## 2021-09-20 NOTE — ED PROVIDER NOTE - OBJECTIVE STATEMENT
36 F abd pain x 1 week - no n/v- feeling bloated since eating salmon several days ago- nml bm this am- vianca po- but just feels off/bloated  no prior abd surgeries- lower extremity cramping- scant vag bleeding- first period since after delivery- baby is 2 months old- has not yet had a period- feels like it may be coming on  mod- severity  no radiation no back pain

## 2021-09-20 NOTE — ED PROVIDER NOTE - NSFOLLOWUPINSTRUCTIONS_ED_ALL_ED_FT
GAS AND BLOATING - AfterCare(R) Instructions(ER/ED)           Gases y distensión abdominal    LO QUE NECESITA SABER:    Los gases se eugene dentro de sandoval cuerpo cuando usted consume ciertos alimentos o aspira demasiado aire. La distensión abdominal es la sensación de opresión y saciedad causada por un exceso de gas.    INSTRUCCIONES SOBRE EL [facility]:    Medicamentos:  •Medicamentos para aliviar los gases:Los medicamentos que facilitan el alivio de gas, podrían reducir el dolor relacionado con sandoval gas y distensión abdominal. Estos medicamentos son disponibles sin dieter receta médica.      •[place] swapnil medicamentos elinor se le haya indicado.Consulte con sandoval médico si usted sha que sandoval medicamento no le está ayudando o si presenta efectos secundarios. Infórmele si es alérgico a cualquier medicamento. Mantenga dieter lista actualizada de los medicamentos, las vitaminas y los productos herbales que jesu. Incluya los siguientes datos de los medicamentos: cantidad, frecuencia y motivo de administración. Traiga con usted la lista o los envases de las píldoras a swapnil citas de seguimiento. Lleve la lista de los medicamentos con usted en betsy de dieter emergencia.      Cómo manejar los gases y la distensión abdominal:  •Mantenga un registro:Anote lo que usted come y kimberly y con qué frecuencia pasa gas cada día.      •Consuma y dixie lentamente:Elija alimentos que no producen gases, elinor carne, aves de llanes, pescado y huevos. Evite los alimentos que contienen mucha grasa y los vegetales o almidones que producen gases. No dixie bebidas carbonatadas. Después de dieter semana, añada estos alimentos nuevamente en sandoval dieta, cl a la vez. Si el alimento a que swapnil síntomas vuelvan, elimínelo de sandoval dieta nuevamente.      •Ejercicio:El ejercicio puede ayudar a aliviar los gases.      •No fume ni mastique goma:Fumar y masticar goma pueden provocar que trague aire.      •Asegúrese de que sandoval dentadura le calce correctamente:Si sandoval dentadura postiza se encuentra suelta, vaya a que se le ajuste. Las dentaduras postizas pueden provocar que trague mucho aire.      Acuda a la consulta de control con sandoval médico según las indicaciones:Anote swapnil preguntas para que se acuerde de hacerlas leydi swapnil visitas.    Comuníquese con sandoval médico si:  •Tiene fiebre.      •Usted vomita o tiene diarrea.      •Usted pierde peso sin proponérselo.      •Usted tiene preguntas o inquietudes acerca de sandoval condición o cuidado.      Regrese a la mary de emergencias si:  •Usted tiene dolor abdominal intenso.      •Usted tiene clark en las heces.         © Copyright [employer] 2021           back to top                          © Copyright [employer] 2021

## 2021-09-20 NOTE — ED PROVIDER NOTE - PATIENT PORTAL LINK FT
You can access the FollowMyHealth Patient Portal offered by [facility] by registering at the following website: [email or web address]. By joining [employer]’s FollowMyHealth portal, you will also be able to view your health information using other applications (apps) compatible with our system.

## 2021-09-20 NOTE — ED ADULT TRIAGE NOTE - CHIEF COMPLAINT QUOTE
pt c/o abd pain/ "not feeling right/ feeling inflamed" s/p eating a piece of salmon last friday. pablito pagan v,bong.

## 2021-09-20 NOTE — ED PROVIDER NOTE - CLINICAL SUMMARY MEDICAL DECISION MAKING FREE TEXT BOX
abd bloating- b9 abd- px requesting blood work for 'bacterial infection' - will check labs, xray, hydrate

## 2021-09-21 LAB
ALBUMIN SERPL ELPH-MCNC: 4.6 G/DL — SIGNIFICANT CHANGE UP (ref 3.3–5)
ALP SERPL-CCNC: 78 U/L — SIGNIFICANT CHANGE UP (ref 40–120)
ALT FLD-CCNC: 31 U/L — SIGNIFICANT CHANGE UP (ref 10–45)
ANION GAP SERPL CALC-SCNC: 6 MMOL/L — SIGNIFICANT CHANGE UP (ref 5–17)
APPEARANCE UR: CLEAR — SIGNIFICANT CHANGE UP
AST SERPL-CCNC: 19 U/L — SIGNIFICANT CHANGE UP (ref 10–40)
BILIRUB SERPL-MCNC: 0.4 MG/DL — SIGNIFICANT CHANGE UP (ref 0.2–1.2)
BILIRUB UR-MCNC: NEGATIVE — SIGNIFICANT CHANGE UP
BUN SERPL-MCNC: 10 MG/DL — SIGNIFICANT CHANGE UP (ref 7–23)
CALCIUM SERPL-MCNC: 10.1 MG/DL — SIGNIFICANT CHANGE UP (ref 8.4–10.5)
CHLORIDE SERPL-SCNC: 103 MMOL/L — SIGNIFICANT CHANGE UP (ref 96–108)
CO2 SERPL-SCNC: 30 MMOL/L — SIGNIFICANT CHANGE UP (ref 22–31)
COLOR SPEC: YELLOW — SIGNIFICANT CHANGE UP
CREAT SERPL-MCNC: 0.63 MG/DL — SIGNIFICANT CHANGE UP (ref 0.5–1.3)
DIFF PNL FLD: NEGATIVE — SIGNIFICANT CHANGE UP
GLUCOSE SERPL-MCNC: 137 MG/DL — HIGH (ref 70–99)
GLUCOSE UR QL: NEGATIVE — SIGNIFICANT CHANGE UP
KETONES UR-MCNC: NEGATIVE — SIGNIFICANT CHANGE UP
LEUKOCYTE ESTERASE UR-ACNC: NEGATIVE — SIGNIFICANT CHANGE UP
LIDOCAIN IGE QN: 51 U/L — SIGNIFICANT CHANGE UP (ref 7–60)
NITRITE UR-MCNC: NEGATIVE — SIGNIFICANT CHANGE UP
PH UR: 6 — SIGNIFICANT CHANGE UP (ref 5–8)
POTASSIUM SERPL-MCNC: 5.2 MMOL/L — SIGNIFICANT CHANGE UP (ref 3.5–5.3)
POTASSIUM SERPL-SCNC: 5.2 MMOL/L — SIGNIFICANT CHANGE UP (ref 3.5–5.3)
PROT SERPL-MCNC: 7.8 G/DL — SIGNIFICANT CHANGE UP (ref 6–8.3)
PROT UR-MCNC: NEGATIVE MG/DL — SIGNIFICANT CHANGE UP
SODIUM SERPL-SCNC: 139 MMOL/L — SIGNIFICANT CHANGE UP (ref 135–145)
SP GR SPEC: 1.02 — SIGNIFICANT CHANGE UP (ref 1–1.03)
UROBILINOGEN FLD QL: 0.2 E.U./DL — SIGNIFICANT CHANGE UP

## 2021-09-21 PROCEDURE — 83690 ASSAY OF LIPASE: CPT

## 2021-09-21 PROCEDURE — 81003 URINALYSIS AUTO W/O SCOPE: CPT

## 2021-09-21 PROCEDURE — 74019 RADEX ABDOMEN 2 VIEWS: CPT | Mod: 26

## 2021-09-21 PROCEDURE — 74019 RADEX ABDOMEN 2 VIEWS: CPT

## 2021-09-21 PROCEDURE — 36415 COLL VENOUS BLD VENIPUNCTURE: CPT

## 2021-09-21 PROCEDURE — 80053 COMPREHEN METABOLIC PANEL: CPT

## 2021-09-21 PROCEDURE — 85025 COMPLETE CBC W/AUTO DIFF WBC: CPT

## 2021-09-21 PROCEDURE — 99283 EMERGENCY DEPT VISIT LOW MDM: CPT | Mod: 25

## 2021-09-21 RX ADMIN — Medication 0.12 MILLIGRAM(S): at 00:14

## 2021-09-21 RX ADMIN — SODIUM CHLORIDE 1000 MILLILITER(S): 9 INJECTION INTRAMUSCULAR; INTRAVENOUS; SUBCUTANEOUS at 00:14

## 2021-09-21 NOTE — ED ADULT NURSE NOTE - OBJECTIVE STATEMENT
c/o intermittent abd pain since eating salmon on friday evening. Denies n/v/d. states 9/10 pain. appears comfortable

## 2021-09-22 NOTE — ED PROVIDER NOTE - CLINICAL SUMMARY MEDICAL DECISION MAKING FREE TEXT BOX
0 = independent 37 y/o F pt presents to ED with para-thoracic back pain. Will give pt lidocaine patch, Tylenol every 6hours, and discharge to f/u with OB tomorrow. 35 y/o F pt presents to ED with para-thoracic back pain. Will give pt lidocaine patch, Tylenol every 6hours, and discharge to f/u with OB tomorrow.  No symptoms or signs to suggest PE as cause of back pain - not tachycardic or hypoxic.  No hx of PE.  No dyspnea.  Symptoms most c/w msk pain.

## 2021-10-19 ENCOUNTER — APPOINTMENT (OUTPATIENT)
Dept: ENDOCRINOLOGY | Facility: CLINIC | Age: 36
End: 2021-10-19

## 2021-11-10 ENCOUNTER — NON-APPOINTMENT (OUTPATIENT)
Age: 36
End: 2021-11-10

## 2022-09-26 ENCOUNTER — EMERGENCY (EMERGENCY)
Facility: HOSPITAL | Age: 37
LOS: 1 days | Discharge: HOME | End: 2022-09-26
Attending: EMERGENCY MEDICINE | Admitting: EMERGENCY MEDICINE
Payer: COMMERCIAL

## 2022-09-26 VITALS
HEART RATE: 73 BPM | DIASTOLIC BLOOD PRESSURE: 85 MMHG | TEMPERATURE: 98 F | OXYGEN SATURATION: 100 % | RESPIRATION RATE: 16 BRPM | SYSTOLIC BLOOD PRESSURE: 123 MMHG

## 2022-09-26 VITALS
TEMPERATURE: 99 F | HEART RATE: 76 BPM | DIASTOLIC BLOOD PRESSURE: 80 MMHG | OXYGEN SATURATION: 99 % | HEIGHT: 60 IN | RESPIRATION RATE: 18 BRPM | WEIGHT: 111.99 LBS | SYSTOLIC BLOOD PRESSURE: 126 MMHG

## 2022-09-26 DIAGNOSIS — R20.0 ANESTHESIA OF SKIN: ICD-10-CM

## 2022-09-26 DIAGNOSIS — R07.89 OTHER CHEST PAIN: ICD-10-CM

## 2022-09-26 DIAGNOSIS — Z20.822 CONTACT WITH AND (SUSPECTED) EXPOSURE TO COVID-19: ICD-10-CM

## 2022-09-26 DIAGNOSIS — Z28.311 PARTIALLY VACCINATED FOR COVID-19: ICD-10-CM

## 2022-09-26 LAB
ANION GAP SERPL CALC-SCNC: 12 MMOL/L — SIGNIFICANT CHANGE UP (ref 5–17)
BASOPHILS # BLD AUTO: 0.06 K/UL — SIGNIFICANT CHANGE UP (ref 0–0.2)
BASOPHILS NFR BLD AUTO: 0.8 % — SIGNIFICANT CHANGE UP (ref 0–2)
BUN SERPL-MCNC: 11 MG/DL — SIGNIFICANT CHANGE UP (ref 7–23)
CALCIUM SERPL-MCNC: 9.7 MG/DL — SIGNIFICANT CHANGE UP (ref 8.4–10.5)
CHLORIDE SERPL-SCNC: 104 MMOL/L — SIGNIFICANT CHANGE UP (ref 96–108)
CO2 SERPL-SCNC: 23 MMOL/L — SIGNIFICANT CHANGE UP (ref 22–31)
CREAT SERPL-MCNC: 0.58 MG/DL — SIGNIFICANT CHANGE UP (ref 0.5–1.3)
EGFR: 118 ML/MIN/1.73M2 — SIGNIFICANT CHANGE UP
EOSINOPHIL # BLD AUTO: 0.21 K/UL — SIGNIFICANT CHANGE UP (ref 0–0.5)
EOSINOPHIL NFR BLD AUTO: 2.6 % — SIGNIFICANT CHANGE UP (ref 0–6)
GLUCOSE SERPL-MCNC: 121 MG/DL — HIGH (ref 70–99)
HCT VFR BLD CALC: 41.1 % — SIGNIFICANT CHANGE UP (ref 34.5–45)
HGB BLD-MCNC: 14 G/DL — SIGNIFICANT CHANGE UP (ref 11.5–15.5)
IMM GRANULOCYTES NFR BLD AUTO: 0.3 % — SIGNIFICANT CHANGE UP (ref 0–0.9)
LYMPHOCYTES # BLD AUTO: 2.82 K/UL — SIGNIFICANT CHANGE UP (ref 1–3.3)
LYMPHOCYTES # BLD AUTO: 35.3 % — SIGNIFICANT CHANGE UP (ref 13–44)
MCHC RBC-ENTMCNC: 31.9 PG — SIGNIFICANT CHANGE UP (ref 27–34)
MCHC RBC-ENTMCNC: 34.1 GM/DL — SIGNIFICANT CHANGE UP (ref 32–36)
MCV RBC AUTO: 93.6 FL — SIGNIFICANT CHANGE UP (ref 80–100)
MONOCYTES # BLD AUTO: 0.72 K/UL — SIGNIFICANT CHANGE UP (ref 0–0.9)
MONOCYTES NFR BLD AUTO: 9 % — SIGNIFICANT CHANGE UP (ref 2–14)
NEUTROPHILS # BLD AUTO: 4.16 K/UL — SIGNIFICANT CHANGE UP (ref 1.8–7.4)
NEUTROPHILS NFR BLD AUTO: 52 % — SIGNIFICANT CHANGE UP (ref 43–77)
NRBC # BLD: 0 /100 WBCS — SIGNIFICANT CHANGE UP (ref 0–0)
PLATELET # BLD AUTO: 270 K/UL — SIGNIFICANT CHANGE UP (ref 150–400)
POTASSIUM SERPL-MCNC: 4.8 MMOL/L — SIGNIFICANT CHANGE UP (ref 3.5–5.3)
POTASSIUM SERPL-SCNC: 4.8 MMOL/L — SIGNIFICANT CHANGE UP (ref 3.5–5.3)
RBC # BLD: 4.39 M/UL — SIGNIFICANT CHANGE UP (ref 3.8–5.2)
RBC # FLD: 13.3 % — SIGNIFICANT CHANGE UP (ref 10.3–14.5)
SARS-COV-2 RNA SPEC QL NAA+PROBE: NEGATIVE — SIGNIFICANT CHANGE UP
SODIUM SERPL-SCNC: 139 MMOL/L — SIGNIFICANT CHANGE UP (ref 135–145)
TROPONIN T SERPL-MCNC: 0.01 NG/ML — SIGNIFICANT CHANGE UP (ref 0–0.01)
WBC # BLD: 7.99 K/UL — SIGNIFICANT CHANGE UP (ref 3.8–10.5)
WBC # FLD AUTO: 7.99 K/UL — SIGNIFICANT CHANGE UP (ref 3.8–10.5)

## 2022-09-26 PROCEDURE — 99285 EMERGENCY DEPT VISIT HI MDM: CPT | Mod: 25

## 2022-09-26 PROCEDURE — 93005 ELECTROCARDIOGRAM TRACING: CPT

## 2022-09-26 PROCEDURE — 85025 COMPLETE CBC W/AUTO DIFF WBC: CPT

## 2022-09-26 PROCEDURE — 71046 X-RAY EXAM CHEST 2 VIEWS: CPT

## 2022-09-26 PROCEDURE — 80048 BASIC METABOLIC PNL TOTAL CA: CPT

## 2022-09-26 PROCEDURE — 93010 ELECTROCARDIOGRAM REPORT: CPT

## 2022-09-26 PROCEDURE — 84484 ASSAY OF TROPONIN QUANT: CPT

## 2022-09-26 PROCEDURE — 71046 X-RAY EXAM CHEST 2 VIEWS: CPT | Mod: 26

## 2022-09-26 PROCEDURE — 87635 SARS-COV-2 COVID-19 AMP PRB: CPT

## 2022-09-26 PROCEDURE — 99285 EMERGENCY DEPT VISIT HI MDM: CPT

## 2022-09-26 PROCEDURE — 36415 COLL VENOUS BLD VENIPUNCTURE: CPT

## 2022-09-26 NOTE — ED PROVIDER NOTE - OBJECTIVE STATEMENT
Pt w/ no sig PMHx, no PSHx p/w CP. She report she awoke rapidly from sleep around 4 am this morning. She felt L sided CP. non radiating, w/ a numbness feeling in the chest. She felt like she couldn't move for approx 6 min and the sx resolved. Pain-free in the ED. She reports she also had HA yesterday, resolved w/ sleep  No recent travel / immobilization / surgery. No exogenous hormone use. No LE edema or calf pain. No hemoptysis. No hx PE / DVT / CA.   No sig FHx premature cardiac disease or sudden death  Non smoker, no drug use  Vaccinated against COVID19 w/o booster Pt w/ no sig PMHx, no PSHx p/w CP. She report she awoke rapidly from sleep around 4 am this morning. She felt L sided CP. non radiating, w/ a numbness feeling in the chest. She felt like she couldn't move her entire body for approx 6 min and the sx resolved. Pain-free in the ED. She reports she also had HA yesterday, resolved w/ sleep  No recent travel / immobilization / surgery. No exogenous hormone use. No LE edema or calf pain. No hemoptysis. No hx PE / DVT / CA.   No sig FHx premature cardiac disease or sudden death  Non smoker, no drug use  Vaccinated against COVID19 w/o booster

## 2022-09-26 NOTE — ED ADULT NURSE NOTE - OBJECTIVE STATEMENT
37y female presents to c/o chest discomfort last night. Pt also endorses some episodes of palpitations today. Denies pain, sob at time of assessment. Denies pmh, daily meds. A&Ox4.

## 2022-09-26 NOTE — ED PROVIDER NOTE - PATIENT PORTAL LINK FT
You can access the FollowMyHealth Patient Portal offered by [facility] by registering at the following website: [email or web address]. By joining [employer]’s FollowMyHealth portal, you will also be able to view your health information using other applications (apps) compatible with our system.

## 2022-09-26 NOTE — ED PROVIDER NOTE - NSFOLLOWUPCLINICS_GEN_ALL_ED_FT
[facility]  Family Medicine  [address]  [place], [place] [address]  Phone: [phone]  Fax:

## 2022-09-26 NOTE — ED PROVIDER NOTE - NSFOLLOWUPINSTRUCTIONS_ED_ALL_ED_FT
Sandoval análisis de clark, electrocardiograma, radiografía de tórax y prueba de COVID 19 fueron negativos y sin anomalías significativas.  Michelle un seguimiento con sandoval médico habitual para dieter evaluación adicional.    Your blood work, EKG, chest x-ray and COVID19 testing were negative and without significant abnormality.     Follow up with your regular medical doctor for further evaluation      Dolor de pecho    LO QUE NECESITA SABER:    El dolor en el pecho puede ser provocado por dieter variedad de condiciones, algunas no tan serias y otras que son de peligro mortal. El dolor de pecho también puede ser un síntoma de un problema digestivo, elinor la acidez o dieter úlcera estomacal. Un ataque de ansiedad o dieter emoción jame, elinor el enojo, también pueden provocar dolor de pecho. Dieter infección, inflamación o fractura en un hueso o cartílago en el pecho podría provocar dolor o molestia. En ocasiones el dolor torácico o la presión en el pecho pueden ser el resultado de maykel circulación de la clark al corazón (angina). El dolor de pecho también puede ser causado por trastornos potencialmente mortales elinor un ataque al corazón o un coágulo de clark en los pulmones.    INSTRUCCIONES SOBRE EL AARON HOSPITALARIA:    Llame al número local de emergencias (911 en los [place]) o pídale a alguien que llame si:  •Tiene alguno de los siguientes signos de un ataque cardíaco: ?Estrujamiento, presión o tensión en sandoval pecho      ?Usted también podría presentar alguno de los siguientes: ?Malestar o dolor en sandoval espalda, maggie, mandíbula, abdomen, o brazo      ?Falta de aliento      ?Náuseas o vómitos      ?Desvanecimiento o sudor frío repentino            Regrese a la mary de emergencias si:  •La inflamación en sandoval pecho empeora, aun con tratamiento.      •Usted tose o vomita clark.      •Jayla heces son negras o tienen clark.      •Usted no puede dejar de vomitar o le duele al tragar.      Llame a sandoval médico si:  •Usted tiene preguntas o inquietudes acerca de sandoval condición o cuidado.          Medicamentos:  •Los medicamentospueden administrarse para tratar la causa del dolor de pecho. Por ejemplo, analgésicos, medicamentos para la ansiedad o medicamentos para aumentar el flujo de clark al corazón.      •No tome ciertos medicamentos sin antes preguntarle a sandoval médico.Estos incluyen INEZ, suplementos vitamínicos y hormonas, elinor el estrógeno o la progestina.      •[place] jayla medicamentos elinor se le haya indicado.Consulte con sandoval médico si usted sha que sandoval medicamento no le está ayudando o si presenta efectos secundarios. Infórmele al médico si usted es alérgico a algún medicamento. Mantenga dieter lista actualizada de los medicamentos, las vitaminas y los productos herbales que jesu. Incluya los siguientes datos de los medicamentos: cantidad, frecuencia y motivo de administración. Traiga con usted la lista o los envases de las píldoras a jayla citas de seguimiento. Lleve la lista de los medicamentos con usted en betsy de dieter emergencia.      Consejos para vivir saludable:Si se conoce la causa de sandoval dolor de pecho, sandoval médico le dará pautas específicas a seguir. Los siguientes son consejos generales de shruthi:  •No fume.La nicotina y otros químicos contenidos en los cigarrillos y cigarros pueden causar daño a jayla pulmones y el corazón. Pida información a sandoval médico si usted actualmente fuma y necesita ayuda para dejar de fumar. Los cigarrillos electrónicos o el tabaco sin humo igualmente contienen nicotina. Consulte con sandoval médico antes de utilizar estos productos.      •Elija dieter variedad de alimentos saludables tan a menudo elinor sea posible.Incluya frutas y verduras frescas, congeladas o enlatadas. También incluya productos lácteos bajos en grasa, pescado, cheryl (sin piel) y trina magras. Sandoval médico o dietista pueden ayudarlo a crear planes de alimentos. Es posible que tenga que evitar ciertos alimentos o bebidas si el dolor es causado por un problema de digestión.  Alimentos saludables           •Reduzca el consumo de sodio (sal).Limite el consumo de alimentos altos en sodio, elinor comidas enlatadas, bocadillos salados y embutidos. Si añade catracho cuando cocina la comida, no añada más en la edge. Elija alimentos enlatados bajos en sodio tanto elinor sea posible.             •Consuma abundante agua al día.El agua ayuda al cuerpo a controlar la temperatura y la presión arterial. Pregunte a sandoval médico cuál es la cantidad de agua que debería consumir cada día.      •Pregunte acerca de la actividad.Sandoval médico le dirá cuáles actividades limitar y cuáles evitar. Pregunte cuándo puede manejar, regresar a sandoval trabajo y tener relaciones sexuales. Pida más información acerca de un plan de ejercicio adecuado para usted.      •Mantenga un peso saludable.Pregúntele a sandoval médico cuál es el peso ideal para usted. Pídale que lo ayude a crear un plan seguro para bajar de peso si tiene sobrepeso.      •Pregunte sobre las vacunas que pudiera necesitar.Sandoval médico puede indicarle qué vacunas necesita y cuándo aplicárselas. Las siguientes vacunas ayudan a prevenir enfermedades que pueden llegar a ser graves para dieter persona con dieter afección cardíaca:?La vacuna contra la gripe se aplica todos los años.Vacúnese contra la gripe tan pronto elinor se recomiende, normalmente en septiembre u octubre.      ?La vacuna contra la neumonía generalmente se aplica cada 5 años.Sandoval médico puede recomendarle la vacuna contra la neumonía si tiene 65 años o más.      ?Las vacunas contra la COVID-19 se administran a los adultos en forma de inyección en 1 o 2 dosis.Se recomienda la vacunación para todos los adultos. También se recomienda dieter dosis de refuerzo (adicional) para todos los adultos. Se recomienda dieter segunda dosis refuerzo para todos los adultos de 50 años o más y para los adultos inmunodeprimidos de 18 años o más. La segunda dosis de refuerzo también se recomienda para los adultos que recibieron la vacuna de 1 dosis elinor primera dosis y de refuerzo. Sandoval médico puede decirle cuándo recibir dieter o ambas dosis de refuerzo.      Evite la enfermedad cardíaca         Acuda a dieter consulta de control con sandoval médico dentro de las 72 horas, o según le hayan indicaron.Es posible que deba regresar para hacerse más pruebas para encontrar la causa del dolor de pecho. Es probable que lo refieran a un especialista, elinor un cardiólogo o un gastroenterólogo. Anote jayla preguntas para que se acuerde de hacerlas leydi jayla visitas.

## 2022-09-26 NOTE — ED ADULT TRIAGE NOTE - CHIEF COMPLAINT QUOTE
Pt c/o L sided chest discomfort and SOB that lasted 5 minutes yesterday morning. States "it felt like something pulled in my chest". Also c/o HA. Denies fevers, chills, n/v, weakness, numbness, tingling. No cardiac hx.

## 2022-09-26 NOTE — ED PROVIDER NOTE - CLINICAL SUMMARY MEDICAL DECISION MAKING FREE TEXT BOX
Pt p/w an episode of CP, ? sleep paralysis. Sx short lived and resolved. There are no EKG changes to suggest ischemia, infarction, or pericarditis. H&P is not c/w dissection, nor PE. Clear lungs with no acute findings on CXR. No risk factors for ACS. D/c w/ f/u PCP

## 2022-09-26 NOTE — ED PROVIDER NOTE - NS ED ROS FT
Constitutional: No fever or chills.   Eyes: No pain, blurry vision, or discharge.  ENMT: No hearing changes, pain, discharge or infections. No neck pain or stiffness.  Cardiac: See HPI. No chest pain with exertion.  Respiratory: No cough or respiratory distress. No hemoptysis. No history of asthma or RAD.  GI: No nausea, vomiting, diarrhea or abdominal pain.  : No dysuria, frequency or burning.  MS: No myalgia, muscle weakness, joint pain or back pain.  Neuro: No focal  weakness. No LOC.  Skin: No skin rash.   Endocrine: No history of thyroid disease or diabetes.  Except as documented in the HPI, all other systems are negative.

## 2022-09-26 NOTE — ED PROVIDER NOTE - PHYSICAL EXAMINATION
Constitutional: Well appearing, awake, alert, oriented to person, place, time/situation and in no apparent distress.  ENMT: Airway patent. Normal MM  Eyes: Clear bilaterally  Cardiac: Normal rate, regular rhythm.  Heart sounds S1, S2.  No murmurs, rubs or gallops. symmetric pulses b/l  Respiratory: Breaths sounds equal and clear b/l. No increased WOB, tachypnea, hypoxia, or accessory mm use. Pt speaks in full sentences.   Gastrointestinal: Abd soft, NT, ND, NABS. No guarding, rebound, or rigidity. No pulsatile abdominal masses. No organomegaly appreciated.  Musculoskeletal: Range of motion is not limited  Neuro: Alert & Oriented x 3. CN II-XII intact. No facial droop. Clear speech. WILLIAMSON w/ 5/5 strength x 4 ext. Normal sensation. No pronator drift. No dysdidokinesia nor dysmetria. Normal gait  Skin: Skin normal color for race, warm, dry and intact. No evidence of rash.  Psych: Alert and oriented to person, place, time/situation. normal mood and affect. no apparent risk to self or others.

## 2023-04-20 NOTE — ED PROVIDER NOTE - NS ED ATTENDING STATEMENT MOD
How Severe Is Your Skin Discoloration?: moderate
Additional History: Patient is being seen today with concerns of discoloration on the scalp.
Attending Only

## 2023-07-18 ENCOUNTER — EMERGENCY (EMERGENCY)
Facility: HOSPITAL | Age: 38
LOS: 1 days | Discharge: HOME | End: 2023-07-18
Attending: EMERGENCY MEDICINE | Admitting: EMERGENCY MEDICINE
Payer: COMMERCIAL

## 2023-07-18 VITALS
OXYGEN SATURATION: 97 % | DIASTOLIC BLOOD PRESSURE: 80 MMHG | HEIGHT: 60 IN | RESPIRATION RATE: 16 BRPM | TEMPERATURE: 99 F | HEART RATE: 87 BPM | SYSTOLIC BLOOD PRESSURE: 115 MMHG | WEIGHT: 113.1 LBS

## 2023-07-18 DIAGNOSIS — R19.7 DIARRHEA, UNSPECIFIED: ICD-10-CM

## 2023-07-18 DIAGNOSIS — R10.13 EPIGASTRIC PAIN: ICD-10-CM

## 2023-07-18 DIAGNOSIS — R10.10 UPPER ABDOMINAL PAIN, UNSPECIFIED: ICD-10-CM

## 2023-07-18 DIAGNOSIS — R53.1 WEAKNESS: ICD-10-CM

## 2023-07-18 PROCEDURE — 99285 EMERGENCY DEPT VISIT HI MDM: CPT

## 2023-07-18 NOTE — ED ADULT TRIAGE NOTE - CHIEF COMPLAINT QUOTE
Pt reports fatigue, weakness x1 week. pt reports today generalized abd pain and diarrhea. Pt denies any N/V.

## 2023-07-19 VITALS
TEMPERATURE: 99 F | SYSTOLIC BLOOD PRESSURE: 124 MMHG | DIASTOLIC BLOOD PRESSURE: 83 MMHG | OXYGEN SATURATION: 100 % | HEART RATE: 70 BPM | RESPIRATION RATE: 16 BRPM

## 2023-07-19 LAB
ALBUMIN SERPL ELPH-MCNC: 4.3 G/DL — SIGNIFICANT CHANGE UP (ref 3.3–5)
ALP SERPL-CCNC: 57 U/L — SIGNIFICANT CHANGE UP (ref 40–120)
ALT FLD-CCNC: 14 U/L — SIGNIFICANT CHANGE UP (ref 10–45)
ANION GAP SERPL CALC-SCNC: 8 MMOL/L — SIGNIFICANT CHANGE UP (ref 5–17)
APPEARANCE UR: CLEAR — SIGNIFICANT CHANGE UP
AST SERPL-CCNC: 16 U/L — SIGNIFICANT CHANGE UP (ref 10–40)
BACTERIA # UR AUTO: SIGNIFICANT CHANGE UP /HPF
BASOPHILS # BLD AUTO: 0.07 K/UL — SIGNIFICANT CHANGE UP (ref 0–0.2)
BASOPHILS NFR BLD AUTO: 0.9 % — SIGNIFICANT CHANGE UP (ref 0–2)
BILIRUB SERPL-MCNC: 0.3 MG/DL — SIGNIFICANT CHANGE UP (ref 0.2–1.2)
BILIRUB UR-MCNC: NEGATIVE — SIGNIFICANT CHANGE UP
BUN SERPL-MCNC: 13 MG/DL — SIGNIFICANT CHANGE UP (ref 7–23)
CALCIUM SERPL-MCNC: 9.1 MG/DL — SIGNIFICANT CHANGE UP (ref 8.4–10.5)
CHLORIDE SERPL-SCNC: 106 MMOL/L — SIGNIFICANT CHANGE UP (ref 96–108)
CO2 SERPL-SCNC: 24 MMOL/L — SIGNIFICANT CHANGE UP (ref 22–31)
COLOR SPEC: YELLOW — SIGNIFICANT CHANGE UP
CREAT SERPL-MCNC: 0.62 MG/DL — SIGNIFICANT CHANGE UP (ref 0.5–1.3)
DIFF PNL FLD: ABNORMAL
EGFR: 115 ML/MIN/1.73M2 — SIGNIFICANT CHANGE UP
EOSINOPHIL # BLD AUTO: 0.23 K/UL — SIGNIFICANT CHANGE UP (ref 0–0.5)
EOSINOPHIL NFR BLD AUTO: 3.1 % — SIGNIFICANT CHANGE UP (ref 0–6)
EPI CELLS # UR: SIGNIFICANT CHANGE UP /HPF (ref 0–5)
GLUCOSE SERPL-MCNC: 103 MG/DL — HIGH (ref 70–99)
GLUCOSE UR QL: NEGATIVE — SIGNIFICANT CHANGE UP
HCG SERPL-ACNC: <0 MIU/ML — SIGNIFICANT CHANGE UP
HCT VFR BLD CALC: 39.3 % — SIGNIFICANT CHANGE UP (ref 34.5–45)
HGB BLD-MCNC: 13.2 G/DL — SIGNIFICANT CHANGE UP (ref 11.5–15.5)
IMM GRANULOCYTES NFR BLD AUTO: 0.1 % — SIGNIFICANT CHANGE UP (ref 0–0.9)
KETONES UR-MCNC: NEGATIVE — SIGNIFICANT CHANGE UP
LEUKOCYTE ESTERASE UR-ACNC: NEGATIVE — SIGNIFICANT CHANGE UP
LIDOCAIN IGE QN: 38 U/L — SIGNIFICANT CHANGE UP (ref 7–60)
LYMPHOCYTES # BLD AUTO: 2.35 K/UL — SIGNIFICANT CHANGE UP (ref 1–3.3)
LYMPHOCYTES # BLD AUTO: 31.5 % — SIGNIFICANT CHANGE UP (ref 13–44)
MAGNESIUM SERPL-MCNC: 1.8 MG/DL — SIGNIFICANT CHANGE UP (ref 1.6–2.6)
MCHC RBC-ENTMCNC: 33 PG — SIGNIFICANT CHANGE UP (ref 27–34)
MCHC RBC-ENTMCNC: 33.6 GM/DL — SIGNIFICANT CHANGE UP (ref 32–36)
MCV RBC AUTO: 98.3 FL — SIGNIFICANT CHANGE UP (ref 80–100)
MONOCYTES # BLD AUTO: 0.78 K/UL — SIGNIFICANT CHANGE UP (ref 0–0.9)
MONOCYTES NFR BLD AUTO: 10.5 % — SIGNIFICANT CHANGE UP (ref 2–14)
NEUTROPHILS # BLD AUTO: 4.02 K/UL — SIGNIFICANT CHANGE UP (ref 1.8–7.4)
NEUTROPHILS NFR BLD AUTO: 53.9 % — SIGNIFICANT CHANGE UP (ref 43–77)
NITRITE UR-MCNC: NEGATIVE — SIGNIFICANT CHANGE UP
NRBC # BLD: 0 /100 WBCS — SIGNIFICANT CHANGE UP (ref 0–0)
PH UR: 6 — SIGNIFICANT CHANGE UP (ref 5–8)
PLATELET # BLD AUTO: 219 K/UL — SIGNIFICANT CHANGE UP (ref 150–400)
POTASSIUM SERPL-MCNC: 4.1 MMOL/L — SIGNIFICANT CHANGE UP (ref 3.5–5.3)
POTASSIUM SERPL-SCNC: 4.1 MMOL/L — SIGNIFICANT CHANGE UP (ref 3.5–5.3)
PROT SERPL-MCNC: 7.3 G/DL — SIGNIFICANT CHANGE UP (ref 6–8.3)
PROT UR-MCNC: NEGATIVE MG/DL — SIGNIFICANT CHANGE UP
RBC # BLD: 4 M/UL — SIGNIFICANT CHANGE UP (ref 3.8–5.2)
RBC # FLD: 12.4 % — SIGNIFICANT CHANGE UP (ref 10.3–14.5)
RBC CASTS # UR COMP ASSIST: < 5 /HPF — SIGNIFICANT CHANGE UP
SODIUM SERPL-SCNC: 138 MMOL/L — SIGNIFICANT CHANGE UP (ref 135–145)
SP GR SPEC: 1.02 — SIGNIFICANT CHANGE UP (ref 1–1.03)
UROBILINOGEN FLD QL: 0.2 E.U./DL — SIGNIFICANT CHANGE UP
WBC # BLD: 7.46 K/UL — SIGNIFICANT CHANGE UP (ref 3.8–10.5)
WBC # FLD AUTO: 7.46 K/UL — SIGNIFICANT CHANGE UP (ref 3.8–10.5)
WBC UR QL: < 5 /HPF — SIGNIFICANT CHANGE UP

## 2023-07-19 PROCEDURE — 76705 ECHO EXAM OF ABDOMEN: CPT

## 2023-07-19 PROCEDURE — 81001 URINALYSIS AUTO W/SCOPE: CPT

## 2023-07-19 PROCEDURE — 93005 ELECTROCARDIOGRAM TRACING: CPT

## 2023-07-19 PROCEDURE — 85025 COMPLETE CBC W/AUTO DIFF WBC: CPT

## 2023-07-19 PROCEDURE — 83690 ASSAY OF LIPASE: CPT

## 2023-07-19 PROCEDURE — 99285 EMERGENCY DEPT VISIT HI MDM: CPT | Mod: 25

## 2023-07-19 PROCEDURE — 80053 COMPREHEN METABOLIC PANEL: CPT

## 2023-07-19 PROCEDURE — 83735 ASSAY OF MAGNESIUM: CPT

## 2023-07-19 PROCEDURE — 96374 THER/PROPH/DIAG INJ IV PUSH: CPT

## 2023-07-19 PROCEDURE — 36415 COLL VENOUS BLD VENIPUNCTURE: CPT

## 2023-07-19 PROCEDURE — 84702 CHORIONIC GONADOTROPIN TEST: CPT

## 2023-07-19 PROCEDURE — 76705 ECHO EXAM OF ABDOMEN: CPT | Mod: 26

## 2023-07-19 RX ORDER — SUCRALFATE 1 G
10 TABLET ORAL
Qty: 400 | Refills: 0
Start: 2023-07-19 | End: 2023-07-28

## 2023-07-19 RX ORDER — FAMOTIDINE 10 MG/ML
20 INJECTION INTRAVENOUS ONCE
Refills: 0 | Status: COMPLETED | OUTPATIENT
Start: 2023-07-19 | End: 2023-07-19

## 2023-07-19 RX ORDER — SODIUM CHLORIDE 9 MG/ML
1000 INJECTION INTRAMUSCULAR; INTRAVENOUS; SUBCUTANEOUS ONCE
Refills: 0 | Status: COMPLETED | OUTPATIENT
Start: 2023-07-19 | End: 2023-07-19

## 2023-07-19 RX ORDER — FAMOTIDINE 10 MG/ML
1 INJECTION INTRAVENOUS
Qty: 20 | Refills: 0
Start: 2023-07-19 | End: 2023-07-28

## 2023-07-19 RX ADMIN — SODIUM CHLORIDE 1000 MILLILITER(S): 9 INJECTION INTRAMUSCULAR; INTRAVENOUS; SUBCUTANEOUS at 00:25

## 2023-07-19 RX ADMIN — FAMOTIDINE 20 MILLIGRAM(S): 10 INJECTION INTRAVENOUS at 00:26

## 2023-07-19 NOTE — ED PROVIDER NOTE - NSFOLLOWUPINSTRUCTIONS_ED_ALL_ED_FT
seguimiento con gastroenterologia    Dolor abdominal    LO QUE NECESITA SABER:    El dolor abdominal puede ser sordo, molesto, o suze. Usted puede sentir dolor localizado en dieter rekha área del abdomen o en todo el abdomen. El dolor puede ser causado por dieter afección elinor estreñimiento, sensibilidad o intoxicación alimentaria, infección o dieter obstrucción. Asimismo, el dolor abdominal puede deberse a dieter hernia, apendicitis o dieter úlcera. Las enfermedades del hígado, la vesícula o el riñón también pueden causar dolor abdominal. Es posible que se desconozca la causa del dolor abdominal.    INSTRUCCIONES SOBRE EL AARON HOSPITALARIA:    Llame al número de emergencias local (911 en los [place]) si:    Usted tiene dolor en el pecho o falta de aire.    Regrese a la mary de emergencias si:    Usted siente un dolor con pulsaciones en la parte superior del abdomen o en la parte inferior de la espalda que de repente se vuelve orlando.    El dolor se localiza en la parte inferior derecha del abdomen y empeora cuando se mueve.    Usted tiene fiebre por encima de los 100.4 °F (38 °C) o escalofríos.    Usted tiene vómitos y no puede retener líquidos ni alimentos en el estómago.    El dolor no mejora o empeora en las próximas 8 a 12 horas.    Usted nota clark en wilson vómito o heces, o éstas tienen un aspecto negruzco y alquitranado.    Wilson piel o las partes maribel de swapnil ojos se vuelven amarillentas.    Si usted es dieter althea y presenta abundante sangrado vaginal que no es wilson menstruación.  Llame a wilson médico si:    Usted siente dolor en la parte inferior de la espalda.    Usted es varón y tiene dolor en los testículos.    Siente dolor al orinar.    Usted tiene preguntas o inquietudes acerca de wilson condición o cuidado.  Medicamentos:Es posible que usted necesite alguno de los siguientes:    Los medicamentospueden administrarse para calmar wilson estómago o prevenir los vómitos.    Puede administrarsepodrían administrarse. Pregunte al médico cómo debe pricila celina medicamento de forma pitts. Algunos medicamentos recetados para el dolor contienen acetaminofén. No tome otros medicamentos que contengan acetaminofén sin consultarlo con wilson médico. Demasiado acetaminofeno puede causar daño al hígado. Los medicamentos recetados para el dolor podrían causar estreñimiento. Pregunte a wilson médico elinor prevenir o tratar estreñimiento.    [place] swapnil medicamentos elinor se le haya indicado.Consulte con wilson médico si usted sha que wilson medicamento no le está ayudando o si presenta efectos secundarios. Infórmele al médico si usted es alérgico a algún medicamento. Mantenga dieter lista actualizada de los medicamentos, las vitaminas y los productos herbales que jesu. Incluya los siguientes datos de los medicamentos: cantidad, frecuencia y motivo de administración. Traiga con usted la lista o los envases de las píldoras a swapnil citas de seguimiento. Lleve la lista de los medicamentos con usted en betsy de dieter emergencia.  Controle o evite el dolor abdominal:    Aplique calorsobre el abdomen de 20 a 30 minutos cada 2 horas por los días que le indiquen. El calor ayuda a disminuir el dolor y los espasmos musculares.    Realice cambios en los alimentos que consuma, de ser necesario.No coma alimentos que causan dolor abdominal u otros síntomas. Ingiera comidas pequeñas, más a menudo. Los siguientes cambios también pueden ayudar:  Coma más alimentos ricos en fibra si tiene estreñimiento.Los alimentos altos en fibra incluyen frutas, verduras, alimentos de grano integral y legumbres, elinor frijoles pintos.    No coma alimentos que causan gas si tiene distensión.Por ejemplo, brócoli, repollo, frijoles y bebidas carbonatadas.    No consuma alimentos o bebidas que contienen sorbitol o fructosa si tiene diarrea y distensión.Algunos ejemplos son jugos de frutas, dulces, mermeladas y gomas de mascar sin azúcar.    No consuma alimentos altos en grasa.Por ejemplo, comidas fritas, hamburguesas con queso, perros calientes y postres.    Realice cambios en los líquidos que tome, de ser necesario.No tome líquidos que le causen dolor o lo empeoren, elinor el jugo de naranja. [place] líquidos leydi el día para mantenerse hidratado. Los siguientes cambios también pueden ayudar:  Mary suficientes líquidos para evitar la deshidratación causada por la diarrea o los vómitos.Pregunte a wilson médico sobre la cantidad de líquido que necesita pricila todos los catarino y cuáles le recomienda.    Limite o no tome cafeína.La cafeína puede empeorar los síntomas, elinor la acidez o las náuseas.    Limite o no consuma bebidas alcohólicas.El alcohol puede empeorar el dolor abdominal. Pregúntele a wilson médico si está tracy que usted consuma alcohol. Pregunte qué cantidad puede beber. Dieter bebida de alcohol equivale a 12 onzas de cerveza, ½ onza de licor o 5 onzas de vino.    Lleve un registro diario del dolor abdominal.Un diario puede ayudar a wilson médico a saber lo que está causando wilson dolor. Incluya cuándo ocurre el dolor, cuánto dura y la sensación causada por el dolor. Anote cualquier otro síntoma que tenga además del dolor abdominal. También anote lo que coma y cualquier síntoma que tenga después de comer.    Controle el estrés.El estrés puede causar dolor abdominal. Wilson médico puede recomendarle técnicas de relajación y ejercicios de respiración profunda para ayudar a disminuir el estrés. Wilson médico puede recomendarle que hable con alguien sobre wilson estrés o ansiedad, elinor un consejero o un amigo. Duerma lo suficiente. Realice actividad física con regularidad.    No fume.La nicotina y otros químicos en los cigarrillos pueden dañarle el esófago y el estómago. Pida información a wilson médico si usted actualmente fuma y necesita ayuda para dejar de fumar. Los cigarrillos electrónicos o el tabaco sin humo igualmente contienen nicotina. Consulte con wilson médico antes de utilizar estos productos.  Acuda a la consulta de control con wilson médico según las indicaciones:Anote swapnil preguntas para que se acuerde de hacerlas leydi swapnil visitas.    Alimentos que debe limitar o evitar:Es posible que usted necesite evitar los alimentos ácidos, picantes o altos en grasa. No todos los alimentos afectan a las personas de la misma manera. Usted necesitará aprender cuáles alimentos empeoran swapnil síntomas y tendrá que limitar esos alimentos. Los siguientes son algunos alimentos que podrían empeorar dieter úlcera o los síntomas de la gastritis:    Bebidas:  Leche entera y leche chocolatada    Chocolate caliente y refrescos de cola    Cualquier bebida con cafeína    Café regular y descafeinado    Té de hierbabuena y menta louis    Té louis o karina, regular o descafeinado    Jugos de naranja y toronja    Bebidas alcohólicas    Especias y condimentos:  Cameron dean y beatris    Polvo de chile    Semilla de mostaza y nuez moscada    Otros alimentos:  Alimentos lácteos hechos de leche entera o crema    Chocolate    Quesos picantes o de sabor jame, elinor de jalapeño o cameron dean    Carne con alto contenido de grasa y muy condimentada, elinor el chorizo, salchichón, tocino, jamón y embutidos    Chiles picantes    Productos derivados del tomate, elinor pasta de tomate, salsa o jugo del mismo  Alimentos que puede incluir:Consuma dieter variedad de alimentos saludables de todos los grupos alimenticios. Consuma frutas, verduras, granos enteros y productos lácteos descremados o bajos en grasa. Los granos integrales incluyen los pan integrales, los cereales, la pasta y el arroz integral. Elija la carne magra, aves de llanes (cheryl y pavo), pescado, frijoles, huevos y kamlesh secos. Un plan alimenticio saludable tiene un contenido bajo de grasas no saludables, sal y azúcar. Las grasas saludables incluyen el aceite de tuttle y de canola. Solicite a wilson dietista más información acerca de un plan alimenticio saludable.    Otras pautas útiles:    No coma flor antes de acostarse.Deje de comer por lo menos 2 horas antes de acostarse.    Coma comidas pequeñas y con frecuencia.Es probable que wilson estómago tolere mejor comidas pequeñas y frecuentes en vez de comidas grandes.

## 2023-07-19 NOTE — ED PROVIDER NOTE - CARE PROVIDER_API CALL
Ricky Todd  Gastroenterology  [address]  [place] [address]  Phone: [phone]  Fax: [phone]  Follow Up Time:     Phong Titus  Gastroenterology  [address], [address]  [place] [address]  Phone: [phone]  Fax: [phone]  Follow Up Time:     Sergey Merchant  Gastroenterology  [address], [address] [place] [id number]  Phone: [phone]  Fax: [phone]  Follow Up Time:

## 2023-07-19 NOTE — ED PROVIDER NOTE - PROVIDER TOKENS
PROVIDER:[TOKEN:[[id number]]],PROVIDER:[TOKEN:[[id number]]],PROVIDER:[TOKEN:[[id number]]]

## 2023-07-19 NOTE — ED PROVIDER NOTE - CLINICAL SUMMARY MEDICAL DECISION MAKING FREE TEXT BOX
epigastric abd pain, no vomiting, no fever, no lower abd pain, doubt appendicitis  -check labs  -ekg  -ivf, pepcid  -US to r/o gallstones

## 2023-07-19 NOTE — ED PROVIDER NOTE - CARE PROVIDERS DIRECT ADDRESSES
[email or web address]

## 2023-07-19 NOTE — ED PROVIDER NOTE - OBJECTIVE STATEMENT
38F no PMH c/o abd pain. pt states upper abd discomfort for past week. states generalized weakness. no fevers, no vomiting. states had some diarrhea but resolved. pain worse with eating. states took omeprazole without relief. no recent travel. daughter sick with fever.

## 2023-07-19 NOTE — ED ADULT NURSE NOTE - OBJECTIVE STATEMENT
38F no known prior medical hx presents c/o generalized abd pain and weakness x1 week. denies fevers/chills, n/v, urinary complaints, headache or recent travel. pt speaking in clear, complete sentences. RR easy, even, un-labored on room air. pt took omeprazole w/o relief.

## 2023-07-19 NOTE — ED PROVIDER NOTE - PROGRESS NOTE DETAILS
no gallstones on US. recommend f/u with GI  I have discussed the discharge plan with the patient. The patient agrees with the plan, as discussed.  The patient understands Emergency Department diagnosis is a preliminary diagnosis often based on limited information and that the patient must adhere to the follow-up plan as discussed.  The patient understands that if the symptoms worsen or if prescribed medications do not have the desired/planned effect that the patient may return to the Emergency Department at any time for further evaluation and treatment.

## 2023-07-19 NOTE — ED PROVIDER NOTE - PATIENT PORTAL LINK FT
You can access the FollowMyHealth Patient Portal offered by [facility] by registering at the following website: [email or web address]. By joining [facility]’s FollowMyHealth portal, you will also be able to view your health information using other applications (apps) compatible with our system.

## 2023-08-18 NOTE — ED ADULT NURSE NOTE - TEMPLATE LIST FOR HEAD TO TOE ASSESSMENT
Detail Level: Detailed
Consent: The patient's consent was obtained including but not limited to risks of crusting, scabbing, blistering, scarring, darker or lighter pigmentary change, recurrence, incomplete removal and infection.
Render Post-Care Instructions In Note?: no
Show Applicator Variable?: Yes
Post-Care Instructions: I reviewed with the patient in detail post-care instructions. Patient is to wear sunprotection, and avoid picking at any of the treated lesions. Pt may apply Vaseline to crusted or scabbing areas.
Duration Of Freeze Thaw-Cycle (Seconds): 0
Cardiac

## 2023-11-03 NOTE — DISCHARGE NOTE OB - DRINK 8 TO 10 GLASSES OF FLUID EACH DAY
EXAM note      History    Sugey Ballard is a 20 year old female who presents with   Chief Complaint   Patient presents with   • Wrist Injury     4        I have reviewed the past medical, family and social history sections including the medications and allergies listed in the above medical record as well as the nursing notes.     HPI:  Patient slipped on ice 2 days ago and landed on an outstretched arm, complains of left wrist pain.  Patient is right handed.    Review of systems    Review of systems as per HPI.        Physical Exam    Vital Signs:    Vitals:    11/03/23 1118   BP: (!) 135/92   Pulse: 80   Resp: 18   Temp: 97.6 °F (36.4 °C)   TempSrc: Oral   SpO2: 96%   Weight: (!) 152 kg (335 lb)   Height: 5' 7\" (1.702 m)   LMP: 10/23/2023       General: No acute distress.    Left upper extremity: Really diminished supination and rotation forearm.  Good flexion and extension of the wrist.    Imaging:  X-ray left wrist normal per my read    Labs:  None    Assessment  Encounter Diagnoses   Name Primary?   • Injury of left wrist, initial encounter Yes       PLAN    OTC meds as needed for pain.  Wrist brace/wrap as needed.  Follow-up as needed.         Statement Selected